# Patient Record
Sex: MALE | Race: WHITE | Employment: OTHER | URBAN - METROPOLITAN AREA
[De-identification: names, ages, dates, MRNs, and addresses within clinical notes are randomized per-mention and may not be internally consistent; named-entity substitution may affect disease eponyms.]

---

## 2018-03-20 ENCOUNTER — PAT TELEPHONE (OUTPATIENT)
Dept: PREADMISSION TESTING | Age: 73
End: 2018-03-20

## 2018-03-20 VITALS — WEIGHT: 200 LBS | HEIGHT: 67 IN | BODY MASS INDEX: 31.39 KG/M2

## 2018-03-20 ASSESSMENT — PAIN - FUNCTIONAL ASSESSMENT: PAIN_FUNCTIONAL_ASSESSMENT: 0-10

## 2018-03-20 ASSESSMENT — PAIN SCALES - GENERAL: PAINLEVEL_OUTOF10: 6

## 2018-03-20 ASSESSMENT — PAIN DESCRIPTION - PAIN TYPE: TYPE: CHRONIC PAIN

## 2018-03-21 ENCOUNTER — HOSPITAL ENCOUNTER (OUTPATIENT)
Dept: ENDOSCOPY | Age: 73
Discharge: HOME | End: 2018-03-21
Attending: INTERNAL MEDICINE | Admitting: INTERNAL MEDICINE

## 2018-03-21 VITALS
DIASTOLIC BLOOD PRESSURE: 54 MMHG | WEIGHT: 198.41 LBS | RESPIRATION RATE: 18 BRPM | OXYGEN SATURATION: 95 % | TEMPERATURE: 97.5 F | BODY MASS INDEX: 31.14 KG/M2 | HEART RATE: 69 BPM | HEIGHT: 67 IN | SYSTOLIC BLOOD PRESSURE: 108 MMHG

## 2018-03-21 LAB
ANION GAP SERPL CALCULATED.3IONS-SCNC: 17 MMOL/L (ref 3–16)
BUN BLDV-MCNC: 16 MG/DL (ref 7–20)
CALCIUM SERPL-MCNC: 8.9 MG/DL (ref 8.3–10.6)
CHLORIDE BLD-SCNC: 98 MMOL/L (ref 99–110)
CO2: 20 MMOL/L (ref 21–32)
CREAT SERPL-MCNC: 1 MG/DL (ref 0.8–1.3)
GFR AFRICAN AMERICAN: >60
GFR NON-AFRICAN AMERICAN: >60
GLUCOSE BLD-MCNC: 134 MG/DL (ref 70–99)
GLUCOSE BLD-MCNC: 185 MG/DL (ref 70–99)
HCT VFR BLD CALC: 41.1 % (ref 40.5–52.5)
HEMOGLOBIN: 13.8 G/DL (ref 13.5–17.5)
MCH RBC QN AUTO: 30.2 PG (ref 26–34)
MCHC RBC AUTO-ENTMCNC: 33.5 G/DL (ref 31–36)
MCV RBC AUTO: 90.3 FL (ref 80–100)
PDW BLD-RTO: 14.4 % (ref 12.4–15.4)
PERFORMED ON: ABNORMAL
PLATELET # BLD: 239 K/UL (ref 135–450)
PMV BLD AUTO: 7.7 FL (ref 5–10.5)
POTASSIUM REFLEX MAGNESIUM: 4.1 MMOL/L (ref 3.5–5.1)
RBC # BLD: 4.55 M/UL (ref 4.2–5.9)
SODIUM BLD-SCNC: 135 MMOL/L (ref 136–145)
WBC # BLD: 14.5 K/UL (ref 4–11)

## 2018-03-21 PROCEDURE — 93010 ELECTROCARDIOGRAM REPORT: CPT | Performed by: INTERNAL MEDICINE

## 2018-03-21 RX ORDER — SODIUM CHLORIDE 0.9 % (FLUSH) 0.9 %
10 SYRINGE (ML) INJECTION EVERY 12 HOURS SCHEDULED
Status: DISCONTINUED | OUTPATIENT
Start: 2018-03-21 | End: 2018-03-22 | Stop reason: HOSPADM

## 2018-03-21 RX ORDER — FENTANYL CITRATE 50 UG/ML
25 INJECTION, SOLUTION INTRAMUSCULAR; INTRAVENOUS EVERY 5 MIN PRN
Status: DISCONTINUED | OUTPATIENT
Start: 2018-03-21 | End: 2018-03-22 | Stop reason: HOSPADM

## 2018-03-21 RX ORDER — OXYCODONE HYDROCHLORIDE AND ACETAMINOPHEN 5; 325 MG/1; MG/1
1 TABLET ORAL PRN
Status: ACTIVE | OUTPATIENT
Start: 2018-03-21 | End: 2018-03-21

## 2018-03-21 RX ORDER — ONDANSETRON 2 MG/ML
4 INJECTION INTRAMUSCULAR; INTRAVENOUS
Status: ACTIVE | OUTPATIENT
Start: 2018-03-21 | End: 2018-03-21

## 2018-03-21 RX ORDER — OXYCODONE HYDROCHLORIDE AND ACETAMINOPHEN 5; 325 MG/1; MG/1
2 TABLET ORAL PRN
Status: ACTIVE | OUTPATIENT
Start: 2018-03-21 | End: 2018-03-21

## 2018-03-21 RX ORDER — MORPHINE SULFATE 2 MG/ML
1 INJECTION, SOLUTION INTRAMUSCULAR; INTRAVENOUS EVERY 5 MIN PRN
Status: DISCONTINUED | OUTPATIENT
Start: 2018-03-21 | End: 2018-03-22 | Stop reason: HOSPADM

## 2018-03-21 RX ORDER — MORPHINE SULFATE 2 MG/ML
2 INJECTION, SOLUTION INTRAMUSCULAR; INTRAVENOUS EVERY 5 MIN PRN
Status: DISCONTINUED | OUTPATIENT
Start: 2018-03-21 | End: 2018-03-22 | Stop reason: HOSPADM

## 2018-03-21 RX ORDER — SODIUM CHLORIDE 0.9 % (FLUSH) 0.9 %
10 SYRINGE (ML) INJECTION PRN
Status: DISCONTINUED | OUTPATIENT
Start: 2018-03-21 | End: 2018-03-22 | Stop reason: HOSPADM

## 2018-03-21 RX ORDER — FENTANYL CITRATE 50 UG/ML
50 INJECTION, SOLUTION INTRAMUSCULAR; INTRAVENOUS EVERY 5 MIN PRN
Status: DISCONTINUED | OUTPATIENT
Start: 2018-03-21 | End: 2018-03-22 | Stop reason: HOSPADM

## 2018-03-21 RX ORDER — MEPERIDINE HYDROCHLORIDE 25 MG/ML
12.5 INJECTION INTRAMUSCULAR; INTRAVENOUS; SUBCUTANEOUS EVERY 5 MIN PRN
Status: DISCONTINUED | OUTPATIENT
Start: 2018-03-21 | End: 2018-03-22 | Stop reason: HOSPADM

## 2018-03-21 RX ORDER — SODIUM CHLORIDE 9 MG/ML
INJECTION, SOLUTION INTRAVENOUS CONTINUOUS
Status: DISCONTINUED | OUTPATIENT
Start: 2018-03-21 | End: 2018-03-22 | Stop reason: HOSPADM

## 2018-03-21 RX ADMIN — SODIUM CHLORIDE: 9 INJECTION, SOLUTION INTRAVENOUS at 09:49

## 2018-03-21 ASSESSMENT — PAIN - FUNCTIONAL ASSESSMENT: PAIN_FUNCTIONAL_ASSESSMENT: 0-10

## 2018-03-21 ASSESSMENT — PAIN DESCRIPTION - PAIN TYPE
TYPE: SURGICAL PAIN
TYPE: SURGICAL PAIN

## 2018-03-21 ASSESSMENT — PAIN SCALES - GENERAL
PAINLEVEL_OUTOF10: 0
PAINLEVEL_OUTOF10: 0

## 2018-03-21 ASSESSMENT — PAIN DESCRIPTION - DESCRIPTORS: DESCRIPTORS: ACHING

## 2018-03-21 ASSESSMENT — LIFESTYLE VARIABLES: SMOKING_STATUS: 0

## 2018-03-21 NOTE — H&P
[facility]   Pre-operative History and Physical    Patient: Harley Blocker  : 1945  Acct#:         HISTORY OF PRESENT ILLNESS:    The patient is a 74 y.o. male  who presents with colon cancer screening  Past Medical History:        Diagnosis Date    CAD (coronary artery disease)     stent    Chronic low back pain     Constipation     d/t opoids use--pt discontinued    Depression     Diabetic acidosis, type II ([address]     History of bradycardia     Hyperlipidemia     Hypertension     Kidney stones     Pacemaker 2011    Medtronic model #ADDR01    Risk for falls     uses a walker/cane when out of house    Self-catheterizes urinary bladder     Since 2017 possibly r/t nerve damage in back    Wears glasses      Past Surgical History:        Procedure Laterality Date    CARDIAC SURGERY      CORONARY ANGIOPLASTY WITH STENT PLACEMENT     CYSTOSCOPY      stents    EYE SURGERY Right     cataract removed right eye    FRACTURE SURGERY Left     fractured ankle    HERNIA REPAIR  90's    ventral hernia w/ mesh    LITHOTRIPSY  06/09/2016    X 4    PACEMAKER PLACEMENT  2011    Medtronic model #ADDR01    RHINOPLASTY  70's    TONSILLECTOMY AND ADENOIDECTOMY       Medications Prior to Admission:   Current Outpatient Prescriptions on File Prior to Encounter   Medication Sig Dispense Refill    metoprolol (TOPROL-XL) 50 MG XL tablet Take 50 mg by mouth nightly       glyBURIDE (DIABETA) 5 MG tablet Take 5 mg by mouth 2 times daily (with meals)       sitaGLIPtan-metformin (JANUMET)  MG per tablet Take 1 tablet by mouth 2 times daily (with meals)      clopidogrel (PLAVIX) 75 MG tablet Take 75 mg by mouth nightly       metFORMIN (GLUCOPHAGE) 500 MG tablet Take 500 mg by mouth See Admin Instructions Takes @ noon daily      Multiple Vitamins-Minerals (MULTIVITAMIN PO) Take by mouth      Naproxen Sodium 220 MG CAPS Take 1 tablet by mouth daily       Red Yeast Rice 600

## 2018-03-21 NOTE — PROGRESS NOTES
Warmed blanket over pt. Awakens more easily. Still sleepy. Moved extremities x4 to command. Resting quietly.

## 2018-03-21 NOTE — ANESTHESIA PRE-OP
Department of Anesthesiology  Preprocedure Note       Name:  Samreen Olmedo   Age:  74 y.o.  :  1945                                          MRN:  [id number]         Date:  3/21/2018      [facility] Department of Anesthesiology  Pre-Anesthesia Evaluation/Consultation       Name:  Samreen Olmedo                                         Age:  74 y.o.   MRN:  [id number]           Procedure (Scheduled):  colonoscopy  Surgeon:  Dr. 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    Dysplasia of tongue    Leukoplakia of oral mucosa/tongue     Past Medical History:   Diagnosis Date    CAD (coronary artery disease)     stent    Chronic low back pain     Constipation 2017    d/t opoids use--pt discontinued    Depression     Diabetic acidosis, type II ([address]     History of bradycardia     Hyperlipidemia     Hypertension     Kidney stones     Pacemaker 2011    Medtronic model #ADDR01    Risk for falls     uses a walker/cane when out of house    Self-catheterizes urinary bladder     Since 2017 possibly r/t nerve damage in back    Wears glasses      Past Surgical History:   Procedure Laterality Date    CARDIAC SURGERY      CORONARY ANGIOPLASTY WITH STENT PLACEMENT     CYSTOSCOPY      stents    EYE SURGERY Right     cataract removed right eye    FRACTURE SURGERY Left     fractured ankle    HERNIA REPAIR  90's    ventral hernia w/ mesh    LITHOTRIPSY  06/09/2016    X 4    PACEMAKER PLACEMENT  2011    Medtronic model #ADDR01    RHINOPLASTY  70's    TONSILLECTOMY AND ADENOIDECTOMY       Social History   Substance Use Topics    Smoking status: Former Smoker     Packs/day: 1.00     Years: 18.00     Types: Cigarettes     Start tablet Take 500 mg by mouth See Admin Instructions Takes @ noon daily      Multiple Vitamins-Minerals (MULTIVITAMIN PO) Take by mouth      Naproxen Sodium 220 MG CAPS Take 1 tablet by mouth daily       Red Yeast Rice 600 MG TABS Take by mouth 2 times daily       Garlic 285 MG TABS Take 600 mg by mouth 2 times daily        Current Facility-Administered Medications   Medication Dose Route Frequency Provider Last Rate Last Dose    0.9 % sodium chloride infusion   Intravenous Continuous Nanda Patton  mL/hr at 03/21/18 0949      sodium chloride flush 0.9 % injection 10 mL  10 mL Intravenous 2 times per day Nanda Patton MD        sodium chloride flush 0.9 % injection 10 mL  10 mL Intravenous PRN Nanda Patton MD           Allergies:     Allergies   Allergen Reactions    Simvastatin Other (See Comments)     myalgia    Statins Other (See Comments)     myalgia    Clarithromycin Rash    Penicillins Rash       Problem List:    Patient Active Problem List   Diagnosis Code    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 D37.02    Dysplasia of tongue K14.9    Leukoplakia of oral mucosa/tongue K13.21       Past Medical History:        Diagnosis Date    CAD (coronary artery disease) 2008    stent    Chronic low back pain     Constipation 2017    d/t opoids use--pt discontinued    Depression     Diabetic acidosis, type II ([facility]     History of bradycardia     Hyperlipidemia     Hypertension     Kidney stones     Pacemaker 09/08/2011    Medtronic model #ADDR01    Risk for falls     uses a walker/cane when out of house    Self-catheterizes urinary bladder     Since Jan 2017 possibly r/t nerve damage in back    Wears glasses        Past Surgical History:        Procedure Laterality Date    CARDIAC SURGERY  2008    CORONARY ANGIOPLASTY WITH STENT PLACEMENT     CYSTOSCOPY      stents    EYE SURGERY Right 2016    cataract removed right eye    FRACTURE SURGERY Left 1966    fractured kidney stones, bowel prep,      (-) hepatitis and liver disease       Endo/Other:    (+) DiabetesType II DM, , blood dyscrasia (did not stop): anticoagulation therapy:., malignancy/cancer (tongue leukoplacia). (-) hypothyroidism, hyperthyroidism               Abdominal:   (+) obese,     Abdomen: soft. Vascular: negative vascular ROS. Anesthesia Plan      TIVA     ASA 3       Induction: intravenous. MIPS: Prophylactic antiemetics administered. Anesthetic plan and risks discussed with patient. Plan discussed with CRNA. This pre-anesthesia assessment may be used as a history and physical.    DOS STAFF ADDENDUM:    Pt seen and examined, chart reviewed (including anesthesia, drug and allergy history). No interval changes to history and physical examination. Anesthetic plan, risks, benefits, alternatives, and personnel involved discussed with patient. Patient verbalized an understanding and agrees to proceed.       Valerie Trujillo MD  March 21, 2018  10:05 AM      Valerie Trujillo MD   3/21/2018

## 2018-03-21 NOTE — ANESTHESIA POST-OP
[facility] Department of Anesthesiology  Post-Anesthesia Note       Name:  Sherryle Rous                                  Age:  74 y.o. MRN:  [id number]     Last Vitals & Oxygen Saturation: BP (!) 108/54   Pulse 69   Temp 97.5 °F (36.4 °C) (Temporal)   Resp 18   Ht 5' 7\" (1.702 m)   Wt 198 lb 6.6 oz (90 kg)   SpO2 95%   BMI 31.08 kg/m²   Patient Vitals for the past 4 hrs:   BP Temp Temp src Pulse Resp SpO2   03/21/18 1226 (!) 108/54 - - 69 - -   03/21/18 1225 (!) 90/47 - - - 18 95 %   03/21/18 1159 (!) 105/56 97.5 °F (36.4 °C) Temporal 71 18 95 %   03/21/18 1132 101/64 - - 70 18 95 %   03/21/18 1128 - - - - - 100 %   03/21/18 1127 97/64 - - 70 16 100 %   03/21/18 1122 100/66 - - 70 18 100 %   03/21/18 1117 93/62 - - 71 21 100 %   03/21/18 1115 (!) 97/55 - - - - 98 %   03/21/18 1112 (!) 97/55 97.6 °F (36.4 °C) Temporal 73 14 99 %       Level of consciousness:  Awake, alert to baseline    Respiratory: Respirations easy, no distress. Stable. Cardiovascular: Hemodynamically stable. Hydration: Adequate. PONV: Adequately managed. Post-op pain: Adequately controlled. Post-op assessment: Tolerated anesthetic well without complication. Complications:  None.     Donnamae Gitelman, MD  March 21, 2018   2:37 PM

## 2018-03-22 LAB
EKG ATRIAL RATE: 71 BPM
EKG DIAGNOSIS: NORMAL
EKG P AXIS: -1 DEGREES
EKG P-R INTERVAL: 100 MS
EKG Q-T INTERVAL: 406 MS
EKG QRS DURATION: 84 MS
EKG QTC CALCULATION (BAZETT): 441 MS
EKG R AXIS: 30 DEGREES
EKG T AXIS: 39 DEGREES
EKG VENTRICULAR RATE: 71 BPM

## 2018-12-14 ENCOUNTER — OFFICE VISIT (OUTPATIENT)
Dept: ENT CLINIC | Age: 73
End: 2018-12-14
Payer: MEDICARE

## 2018-12-14 VITALS
DIASTOLIC BLOOD PRESSURE: 70 MMHG | BODY MASS INDEX: 31.39 KG/M2 | HEIGHT: 67 IN | HEART RATE: 72 BPM | WEIGHT: 200 LBS | SYSTOLIC BLOOD PRESSURE: 129 MMHG

## 2018-12-14 DIAGNOSIS — K13.21 LEUKOPLAKIA OF ORAL MUCOSA/TONGUE: Primary | ICD-10-CM

## 2018-12-14 DIAGNOSIS — K14.8 TONGUE LESION: ICD-10-CM

## 2018-12-14 PROBLEM — E78.5 HYPERLIPIDEMIA: Status: ACTIVE | Noted: 2018-12-14

## 2018-12-14 PROBLEM — M47.816 FACET ARTHROPATHY, LUMBAR: Status: ACTIVE | Noted: 2018-04-17

## 2018-12-14 PROBLEM — E11.9 TYPE 2 DIABETES MELLITUS (HCC): Status: ACTIVE | Noted: 2018-12-14

## 2018-12-14 PROBLEM — M48.02 CERVICAL SPINAL STENOSIS: Status: ACTIVE | Noted: 2017-01-30

## 2018-12-14 PROBLEM — M85.861 OSTEOPENIA OF BOTH LOWER LEGS: Status: ACTIVE | Noted: 2018-08-02

## 2018-12-14 PROBLEM — R53.1 RIGHT SIDED WEAKNESS: Status: ACTIVE | Noted: 2017-01-26

## 2018-12-14 PROBLEM — S32.030A CLOSED COMPRESSION FRACTURE OF L3 LUMBAR VERTEBRA: Status: ACTIVE | Noted: 2018-04-17

## 2018-12-14 PROBLEM — M19.90 ARTHRITIS: Status: ACTIVE | Noted: 2018-12-14

## 2018-12-14 PROBLEM — M51.36 DDD (DEGENERATIVE DISC DISEASE), LUMBAR: Status: ACTIVE | Noted: 2018-04-17

## 2018-12-14 PROBLEM — R33.9 RETENTION OF URINE: Status: ACTIVE | Noted: 2017-01-30

## 2018-12-14 PROBLEM — M85.862 OSTEOPENIA OF BOTH LOWER LEGS: Status: ACTIVE | Noted: 2018-08-02

## 2018-12-14 PROBLEM — I10 HYPERTENSION: Status: ACTIVE | Noted: 2018-12-14

## 2018-12-14 PROCEDURE — 99214 OFFICE O/P EST MOD 30 MIN: CPT | Performed by: OTOLARYNGOLOGY

## 2018-12-14 RX ORDER — ACETAMINOPHEN 160 MG
1000 TABLET,DISINTEGRATING ORAL
COMMUNITY

## 2018-12-14 RX ORDER — TRIAMCINOLONE ACETONIDE 0.1 %
PASTE (GRAM) DENTAL
COMMUNITY
Start: 2018-04-16 | End: 2022-08-23

## 2018-12-14 RX ORDER — POLYETHYLENE GLYCOL 3350 17 G/17G
17 POWDER, FOR SOLUTION ORAL DAILY PRN
COMMUNITY

## 2018-12-14 RX ORDER — CIPROFLOXACIN 250 MG/1
250 TABLET, FILM COATED ORAL 2 TIMES DAILY
COMMUNITY
End: 2022-08-23

## 2018-12-14 RX ORDER — MAGNESIUM GLUCONATE 27 MG(500)
500 TABLET ORAL 2 TIMES DAILY
COMMUNITY

## 2018-12-14 RX ORDER — CHOLECALCIFEROL (VITAMIN D3) 125 MCG
CAPSULE ORAL DAILY
COMMUNITY

## 2019-01-18 ENCOUNTER — PROCEDURE VISIT (OUTPATIENT)
Dept: ENT CLINIC | Age: 74
End: 2019-01-18
Payer: MEDICARE

## 2019-01-18 VITALS
BODY MASS INDEX: 29.82 KG/M2 | SYSTOLIC BLOOD PRESSURE: 129 MMHG | HEIGHT: 67 IN | DIASTOLIC BLOOD PRESSURE: 72 MMHG | WEIGHT: 190 LBS | HEART RATE: 71 BPM

## 2019-01-18 DIAGNOSIS — K13.21 LEUKOPLAKIA OF ORAL MUCOSA/TONGUE: Primary | ICD-10-CM

## 2019-01-18 DIAGNOSIS — K14.8 TONGUE LESION: ICD-10-CM

## 2019-01-18 PROCEDURE — 41100 BIOPSY OF TONGUE: CPT | Performed by: OTOLARYNGOLOGY

## 2019-01-25 ENCOUNTER — TELEPHONE (OUTPATIENT)
Dept: ENT CLINIC | Age: 74
End: 2019-01-25

## 2019-01-29 ENCOUNTER — TELEPHONE (OUTPATIENT)
Dept: ENT CLINIC | Age: 74
End: 2019-01-29

## 2019-02-20 ENCOUNTER — TELEPHONE (OUTPATIENT)
Dept: ENT CLINIC | Age: 74
End: 2019-02-20

## 2022-08-02 ENCOUNTER — OFFICE VISIT (OUTPATIENT)
Dept: ENT CLINIC | Age: 77
End: 2022-08-02
Payer: MEDICARE

## 2022-08-02 VITALS
HEART RATE: 74 BPM | TEMPERATURE: 97.7 F | OXYGEN SATURATION: 96 % | SYSTOLIC BLOOD PRESSURE: 125 MMHG | DIASTOLIC BLOOD PRESSURE: 74 MMHG

## 2022-08-02 DIAGNOSIS — K13.21 LEUKOPLAKIA OF ORAL MUCOSA/TONGUE: Primary | ICD-10-CM

## 2022-08-02 PROCEDURE — 99204 OFFICE O/P NEW MOD 45 MIN: CPT | Performed by: OTOLARYNGOLOGY

## 2022-08-02 PROCEDURE — 1123F ACP DISCUSS/DSCN MKR DOCD: CPT | Performed by: OTOLARYNGOLOGY

## 2022-08-02 ASSESSMENT — ENCOUNTER SYMPTOMS
SINUS PAIN: 0
RHINORRHEA: 0
VOICE CHANGE: 0
TROUBLE SWALLOWING: 0
SORE THROAT: 0

## 2022-08-02 NOTE — PATIENT INSTRUCTIONS
Mouthwash with half strength peroxide (one half cup water plus one half cup hydrogen peroxide plus one half teaspoon of table salt) twice daily. Use Magic mouthwash as prescribed.   Hold your plavix for 7 days prior to the next visit if approved by your PCP and/or cardiologist.  Arville Lincoln your aspirin for 72 hours (3 full days) prior the next visit if approved by your PCP and/or cardiologist.

## 2022-08-02 NOTE — PROGRESS NOTES
[facility]       \"NEW\"  PATIENT VISIT   (ESTABLISHED, who has not been seen by a [facility] Otolaryngologist in over 3 years)       PCP:  lCary Douglas, [facility]  Chief Complaint   Patient presents with    Mouth Lesions     Lesion under the tongue        HISTORY OF PRESENT ILLNESS       Marion Ty is a 64 y.o. male. Went to dentist for dentures and dentist noted lesion on the tongue and said that needs to be taken care of before they will do dentures. He did not return for FU after last visit and was ill and hospitalized and \"I was in rehab for a while and then I just forgot about it and it wasn't bothering me. \"  No bleeding or drainage from the lesion. It hurts when I eat something spicy hot and hurts sometimes. \"It has never been as bad as it was three years ago or I would have come in sooner. \"      REVIEW OF SYSTEMS   Review of Systems   Constitutional:  Negative for chills, fever and unexpected weight change. 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 Negative for congestion, ear discharge, ear pain, hearing loss, nosebleeds, postnasal drip, rhinorrhea, sinus pain, sore throat, trouble swallowing and voice change. Neurological:  Negative for dizziness.          PAST MEDICAL HISTORY    Past Medical History:   Diagnosis Date    CAD (coronary artery disease) 2008    stent    Chronic low back pain     Constipation 2017    d/t opoids use--pt discontinued    Depression     Diabetic acidosis, type II ([facility] [address]     History of bradycardia     Hyperlipidemia     Hypertension     Kidney stones     Pacemaker 09/08/2011    Medtronic model #ADDR01    Risk for falls     uses a walker/cane when out of house    Self-catheterizes urinary bladder     Since Jan 2017 possibly r/t nerve damage in back    Wears glasses Past Surgical History:   Procedure Laterality Date    CARDIAC SURGERY  2008    CORONARY ANGIOPLASTY WITH STENT PLACEMENT     COLONOSCOPY      CYSTOSCOPY      stents    EYE SURGERY Right 2016    cataract removed right eye    FRACTURE SURGERY Left 1966    fractured ankle    HERNIA REPAIR  90's    ventral hernia w/ mesh    LITHOTRIPSY  06/09/2016    X 4    PACEMAKER PLACEMENT  08/2011    Medtronic model #ADDR01    RHINOPLASTY  70's    TONSILLECTOMY AND ADENOIDECTOMY           EXAMINATION    Vitals:    08/02/22 1416   BP: 125/74   Site: Left Upper Arm   Position: Sitting   Cuff Size: Medium Adult   Pulse: 74   Temp: 97.7 °F (36.5 °C)   TempSrc: Temporal   SpO2: 96%     General:  WDWN, NAD, alert and oriented  Face: There was no swelling or lesions detected. Voice: Normal with no hoarseness or hot potato voice. Ears:  TMs and EACs appeared to be normal. ed.      Nose: The nasal septum, turbinates, secretions, and mucosa appeared to be normal.   Sinuses:  Maxillary and frontal sinuses were nontender to palpation and percussion. Oral cavity:  Mucosa, secretions, tongue, and gingiva appeared to be normal.   Oropharynx:  Post tonsillectomy. The hard and soft palates, uvula, tongue, posterior oropharyngeal wall, mucosa and secretions appeared to be normal.     Salivary Glands:  Normal bilateral parotid and bilateral submandibular salivary glands. Neck:  No masses or tenderness. Trachea midline. Laryngeal cartilages and hyoid bone normal.  Normal laryngeal crepitus. Thyroid:  Normal, nontender, no goiter or nodules palpable. Lymph nodes:  No cervical lymphadenopathy. CAROL / Rosamaria Armando / Lidia Burks was seen today for mouth lesions. Diagnoses and all orders for this visit:    Leukoplakia of oral mucosa/tongue  -     Magic Mouthwash (MIRACLE MOUTHWASH);  Swish and spit 5 mLs 4 times daily as needed for Irritation         RECOMMENDATIONS/PLAN      Acetaminophen (eg. Tylenol) or Ibuprofen (eg. Advil) (over the counter medications) as needed for fever or pain. Return in about 3 weeks (around 8/23/2022) for biopsy of oral lesion, if persistent. .        Patient Instructions   Mouthwash with half strength peroxide (one half cup water plus one half cup hydrogen peroxide plus one half teaspoon of table salt) twice daily. Use Magic mouthwash as prescribed.   Hold your plavix for 7 days prior to the next visit if approved by your PCP and/or cardiologist.  Arminda Chaoon your aspirin for 72 hours (3 full days) prior the next visit if approved by your PCP and/or cardiologist.

## 2022-08-23 ENCOUNTER — OFFICE VISIT (OUTPATIENT)
Dept: ENT CLINIC | Age: 77
End: 2022-08-23
Payer: MEDICARE

## 2022-08-23 VITALS
TEMPERATURE: 97.5 F | DIASTOLIC BLOOD PRESSURE: 80 MMHG | OXYGEN SATURATION: 76 % | SYSTOLIC BLOOD PRESSURE: 130 MMHG | HEART RATE: 94 BPM

## 2022-08-23 DIAGNOSIS — K13.21 LEUKOPLAKIA OF ORAL MUCOSA/TONGUE: Primary | ICD-10-CM

## 2022-08-23 DIAGNOSIS — D37.02 NEOPLASM OF UNCERTAIN BEHAVIOR OF ANTERIOR TWO-THIRDS OF TONGUE: ICD-10-CM

## 2022-08-23 PROCEDURE — 41100 BIOPSY OF TONGUE: CPT | Performed by: OTOLARYNGOLOGY

## 2022-08-23 NOTE — PATIENT INSTRUCTIONS
Ivelisse Ayala. Nelida Nielsen M.D.  [address]  [phone]    POST OPERATIVE INSTRUCTIONS  BIOPSY OF TONGUE LESION    The following information should answer most of your questions regarding what to expect and the post operative care following your oral lesion biopsy. Please read this through and keep it available through the first 2 weeks after surgery. ORAL PAIN  You may experience oral pain after the procedure. Use acetaminophen (Tylenol) for mild to moderate post pain. You may use your Percocet as needed for severe pain. BLEEDING  Occasionally bleeding occurs following biopsy of intra-oral lesions. This is usually not severe and not serious. If this occurs, do the following:  Lie quietly with the head elevated  Use cold water as a mouthwash. Hold pressure on the tongue with a gauze pad for 10 minutes. Repeat as needed. If bleeding continues longer than 30 minutes or is greater than 1 cup, call [phone] to have Dr. Nelida Nielsen of the physician on call paged. ACTIVITY  Your normal activity may be resumed. DIET   Soft diet may be preferred for the first 3 to 5 days. Avoid any spicy or hot foods or fluids, until these can be eaten comfortably. You may resume a regular diet as soon as this is tolerated. SWELLING  Swelling of the operated area is typical and expected. This will usually subside over 5 to 10 days after surgery. QUESTIONS? If you have any questions or have any problems after surgery, please do not hesitate to call our office at [phone].

## 2022-08-23 NOTE — PROGRESS NOTES
PROCEDURE: Biopsy of lesion, of tongue. INDICATIONS:  Leukoplakia and erythroplakia of mid right lateral tongue and posterior lateral tongue (anterior 2/3). INFORMED CONSENT:  Patient was counseled for the procedure above, which was described. The attendant risks and potential complications were discussed including, but not limited to the following: persistence or recurrence of lesion, excessive (hypertrophic) scar, infection, bleeding, numbness of tongue, and need for further surgery or therapy. Patient asked appropriate questions and then stated understanding and acceptance of all of the preceding, the lack of further questions, and the desire to proceed with biopsy. Informed  consent obtained and documented on the informed consent document      DESCRIPTION OF PROCEDURE:  The right lateral tongue was sprayed with Cetacaine spray. After approximately one minute, each site was infiltrated with 0.5 ml of 1% lidocaine with 1:100,000 epinephrine. Two biopsies were taken of the lateral tongue mucosa, one about one cm anterior to the more posterior site, using a cup biopsy forceps and Metzenbaum sharp dissecting scissors. Bleeding was controlled with a silver nitrate stick and pressure. No complications were apparent. Patient was discharged to home in stable condition. IMPRESSION:  1. Leukoplakia of oral mucosa/tongue          PLAN:    Acetaminophen (eg. Tylenol) or Ibuprofen (eg. Advil) OTC prn pain. Call for results of pathology in 10 days, if not informed sooner. Avoid hot or spicy foods until these can be eaten comfortable. Return for follow-up, to be arranged. Orders Placed This Encounter   Procedures    SURGICAL PATHOLOGY    Surgical Pathology       Patient Instructions   Leonel Bryant.  Naomi Amato M.D.  [address] [place] [address], [address] [place] [address]  [phone]    POST OPERATIVE INSTRUCTIONS  BIOPSY OF TONGUE LESION    The following information should answer most of your questions

## 2022-08-29 ENCOUNTER — TELEPHONE (OUTPATIENT)
Dept: ENT CLINIC | Age: 77
End: 2022-08-29

## 2022-08-29 NOTE — TELEPHONE ENCOUNTER
Patient is calling in for his results of his oral lesion biopsy done in the office on 8/23/2022. Patient got an email from [facility]. Checked his mychart and it told him to call the office for results.

## 2022-09-02 ENCOUNTER — TELEPHONE (OUTPATIENT)
Dept: ENT CLINIC | Age: 77
End: 2022-09-02

## 2022-09-02 NOTE — TELEPHONE ENCOUNTER
Please call patient and see if he can come into the office this afternoon for a recheck of the tongue lesion and discuss his pathology results.

## 2022-09-08 ENCOUNTER — OFFICE VISIT (OUTPATIENT)
Dept: ENT CLINIC | Age: 77
End: 2022-09-08
Payer: MEDICARE

## 2022-09-08 VITALS — SYSTOLIC BLOOD PRESSURE: 123 MMHG | DIASTOLIC BLOOD PRESSURE: 79 MMHG | HEART RATE: 87 BPM | TEMPERATURE: 97.5 F

## 2022-09-08 DIAGNOSIS — C02.3 MALIGNANT NEOPLASM OF ANTERIOR TWO-THIRDS OF TONGUE (HCC): Primary | ICD-10-CM

## 2022-09-08 DIAGNOSIS — C02.3 MALIGNANT NEOPLASM OF ANTERIOR TWO-THIRDS OF TONGUE (HCC): Primary | Chronic | ICD-10-CM

## 2022-09-08 PROCEDURE — 1123F ACP DISCUSS/DSCN MKR DOCD: CPT | Performed by: OTOLARYNGOLOGY

## 2022-09-08 PROCEDURE — 99214 OFFICE O/P EST MOD 30 MIN: CPT | Performed by: OTOLARYNGOLOGY

## 2022-09-26 ENCOUNTER — HOSPITAL ENCOUNTER (OUTPATIENT)
Dept: PET IMAGING | Age: 77
Discharge: HOME | End: 2022-09-26
Payer: MEDICARE

## 2022-09-26 DIAGNOSIS — C02.9 TONGUE CANCER (HCC): ICD-10-CM

## 2022-09-26 PROCEDURE — 3430000000 HC RX DIAGNOSTIC RADIOPHARMACEUTICAL: Performed by: RADIOLOGY

## 2022-09-26 PROCEDURE — A9552 F18 FDG: HCPCS | Performed by: RADIOLOGY

## 2022-09-26 PROCEDURE — 78815 PET IMAGE W/CT SKULL-THIGH: CPT

## 2022-09-26 RX ORDER — FLUDEOXYGLUCOSE F 18 200 MCI/ML
15.74 INJECTION, SOLUTION INTRAVENOUS
Status: COMPLETED | OUTPATIENT
Start: 2022-09-26 | End: 2022-09-26

## 2022-09-26 RX ADMIN — FLUDEOXYGLUCOSE F 18 15.74 MILLICURIE: 200 INJECTION, SOLUTION INTRAVENOUS at 10:56

## 2022-10-03 NOTE — PROGRESS NOTES
History:  Recheck and follow up for lesion of tongue. Examination:    WDWN NAD  Oral cavity:  Lesion of right lateral tongue with healing biopsy sites and otherwise without change. Neck:  no masses or tenderness. Lymphatic:  no cervical lymphadenopathy. PATHOLOGY:  FINAL DIAGNOSIS:        A. Right lateral tongue, biopsy:      - Invasive, moderately differentiated keratinizing squamous carcinoma        with focal ulceration.      - Tumor involves the margins of the biopsy fragment. B. Right lateral tongue, biopsy:      - Focal squamous carcinoma in-situ, extending to the edges of the        biopsy fragment.      - No invasive malignancy identified; see comment. COUNSELING:  Sandie Garcia  was counseled for at length for the diagnosis of invasive squamous cell carcinoma of the tongue. I advised of the alternative for further treatment / management, which would entail surgical excision, or radiation therapy, or a combined treatment with surgical excision and post op radiation therapy. Rupa Wall / Enzo Odom / Kojo Pratt:   Sandie Garcia was seen today for follow-up. Diagnoses and all orders for this visit:    Malignant neoplasm of anterior two-thirds of tongue ([address]  -     CT SOFT TISSUE NECK Chika Murillo MD, Radiation Oncology, [facility]       RECOMMENDATIONS / PLAN:   Right partial glossectomy. Consultation with radiation oncologist regarding radiation therapy option. Patient will decide on options of further management.

## 2022-10-05 ENCOUNTER — TELEPHONE (OUTPATIENT)
Dept: ENT CLINIC | Age: 77
End: 2022-10-05

## 2022-10-05 NOTE — TELEPHONE ENCOUNTER
Patient is calling to follow up on his PET scan results he had with Dr. John Escoto. He states Dr. John Escoto' office told him Dr. Wallis Lesches would be in touch with him.

## 2022-10-06 NOTE — TELEPHONE ENCOUNTER
Phone call. Spoke to patient. Discussed his PET scan and the biopsy results. Discussed Dr. Vance Beyer recommendations. Discussed surgical excision of the carcinoma of the right lateral tongue, using CO2 laser. Discussed the amount of tongue to be removed and advised that at least a 1 cm margin around gross tumor is necessary. Advised that this probably would not involve removal of the whole tongue and probably not even half of the tongue, but would involve a significant portion of the tongue. I advised him that his ability to speak and swallow would be altered to some degree but the lesser amount of tongue removed would give a lesser effect. I advised him to come in to the office for a recheck and then we can plan surgery. He agreed to come in on October 14 at 1 PM for this purpose.

## 2022-10-14 ENCOUNTER — OFFICE VISIT (OUTPATIENT)
Dept: ENT CLINIC | Age: 77
End: 2022-10-14
Payer: MEDICARE

## 2022-10-14 VITALS
DIASTOLIC BLOOD PRESSURE: 74 MMHG | TEMPERATURE: 97.7 F | HEART RATE: 65 BPM | SYSTOLIC BLOOD PRESSURE: 129 MMHG | BODY MASS INDEX: 31.32 KG/M2 | WEIGHT: 200 LBS

## 2022-10-14 DIAGNOSIS — C02.3 MALIGNANT NEOPLASM OF ANTERIOR TWO-THIRDS OF TONGUE (HCC): Primary | ICD-10-CM

## 2022-10-14 PROCEDURE — 3078F DIAST BP <80 MM HG: CPT | Performed by: OTOLARYNGOLOGY

## 2022-10-14 PROCEDURE — 3074F SYST BP LT 130 MM HG: CPT | Performed by: OTOLARYNGOLOGY

## 2022-10-14 PROCEDURE — 1123F ACP DISCUSS/DSCN MKR DOCD: CPT | Performed by: OTOLARYNGOLOGY

## 2022-10-14 PROCEDURE — 99214 OFFICE O/P EST MOD 30 MIN: CPT | Performed by: OTOLARYNGOLOGY

## 2022-10-14 NOTE — PROGRESS NOTES
[facility]       PCP:  Nirmal Chamorro DO      CHIEF COMPLAINT  Chief Complaint   Patient presents with    Cancer     tongue       HISTORY OF PRESENT ILLNESS    Mitali Chavez is a 64 y.o. male here for recheck and follow up of SCC tongue, right side. He saw the radiation oncologist who felt this would be better treated with surgical excision, reserving radiation therapy for possible future need. patient stated that he desires to proceed with surgery.         PAST MEDICAL HISTORY    Past Medical History:   Diagnosis Date    CAD (coronary artery disease) 2008    stent    Chronic low back pain     Constipation 2017    d/t opoids use--pt discontinued    Depression     Diabetic acidosis, type II ([address]     History of bradycardia     Hyperlipidemia     Hypertension     Kidney stones     Pacemaker 09/08/2011    Medtronic model #ADDR01    Prolonged emergence from general anesthesia     Risk for falls     uses a walker/cane when out of house    Self-catheterizes urinary bladder     Since Jan 2017 possibly r/t nerve damage in back    Wears glasses          Past Surgical History:   Procedure Laterality Date    CARDIAC SURGERY  2008    CORONARY ANGIOPLASTY WITH [address]      stents    EYE SURGERY Right 2016    cataract removed right eye    FRACTURE SURGERY Left 1966    fractured ankle    GLOSSECTOMY N/A 11/4/2022    GLOSSECTOMY, PARTIAL; WIDE LOCAL EXCISION OF SQUAMOUS CELL CARCINOMA OF RIGHT VENTROLATERAL TONUE WITH C02 LASER WITH FROZEN SECTIONS WITH CLOSURE performed by Bee Ricks MD at [facility]  90's    ventral hernia w/ mesh    LITHOTRIPSY  06/09/2016    X 4    PACEMAKER PLACEMENT  08/2011    Medtronic model #ADDR01    RHINOPLASTY  70's    TONSILLECTOMY AND ADENOIDECTOMY           EXAMINATION    Vitals:    10/14/22 1259   BP: 129/74   Pulse: 65   Temp: 97.7 °F (36.5 °C) Weight: 200 lb (90.7 kg)     General:  WDWN, NAD, alert and oriented  Face: There was no swelling or lesions detected. Voice: Normal with no hoarseness or hot potato voice. 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 The remainder of the oral mucosa, secretions, tongue, and gingiva appeared to be normal.   Oropharynx:  Post tonsillectomy. The hard and soft palates, uvula, tongue, posterior oropharyngeal wall, mucosa and secretions appeared to be normal.     Salivary Glands:  Normal bilateral parotid and bilateral submandibular salivary glands. Neck:  No masses or tenderness. Trachea midline. Laryngeal cartilages and hyoid bone normal.  Normal laryngeal crepitus. Lymph nodes:  No cervical lymphadenopathy. COUNSELING FOR SURGICAL PROCEDURE(S):  Stone Treviño was advised of the recommended surgery/procedure. Stone Treviño stated that he wants to proceed with surgery. Stone Treviño understands this will be done under general anesthesia. The surgical procedure was described. I discussed the incisional scar and possible resultant deformity. I discussed the surgical technique and the usual post operative course. I discussed the possibility of persistent or recurrent cancer. I advised there is no guarantee of cure, success, or of final results.   I discussed the alternatives of therapy, expected outcome and potential benefits, and the attendant risks and potential complications, including, but not limited to, excessive intraoperative or postoperative bleeding, hemorrhage, infection, numbness or paralysis, hoarseness, change in voice, airway obstruction, injury to surrounding structures or organs, injury to major blood vessels or nerves, excessive scarring, deformity of tongue, poor (incomplete or lack of) wound healing, pain, discomfort, numbness of lip/teeth/gums/tongue, recurrence of cancer, need for further surgery, and risks of anesthesia, including heart attack, cardiac arrest, stroke, blood clots in lower extremity veins, pulmonary embolism, and death, and other risks listed on the informed consent document, which we discussed together. All Austen's questions were answered. Forest Pop then stated and confirmed that he understands and accepts the preceding, has no further questions and desires to proceed with surgery. Then, Forest Kiesha read and signed the informed consent document. Bernadette Ruiz / Frankey Lennert / Zenia Ndiaye was seen today for cancer. Diagnoses and all orders for this visit:    Malignant neoplasm of anterior two-thirds of tongue ([facility] [address]       RECOMMENDATIONS/PLAN      Wide local excision of SCC of right lateral tongue with CO2 laser and/or electrocautery, possible local tongue flap reconstruction. Return for post op check.

## 2022-11-03 RX ORDER — LATANOPROST 50 UG/ML
1 SOLUTION/ DROPS OPHTHALMIC NIGHTLY
COMMUNITY

## 2022-11-03 RX ORDER — ASPIRIN 81 MG/1
81 TABLET ORAL DAILY
COMMUNITY

## 2022-11-03 NOTE — PROGRESS NOTES
Name_______________________________________Printed:____________________  Date and time of surgery______11/4/22  0730__________________Arrival Time:__0600  [facility]______________   1. The instructions given regarding when and if a patient needs to stop oral intake prior to surgery varies. Follow the specific instructions you were given                  XXXXX___Nothing to eat or to drink after Midnight the night before.                   ____Carbo loading or ERAS instructions will be given to select patients-if you have been given those instructions -please do the following                           The evening before your surgery after dinner before midnight drink 40 ounces of gatorade. If you are diabetic use sugar free. The morning of surgery drink 40 ounces of water. This needs to be finished 3 hours prior to your surgery start time. 2. Take the following pills with a small sip of water on the morning of surgery____none_______________________________________________                  Do not take blood pressure medications ending in pril or sartan the gauri prior to surgery or the morning of surgery_   3. Aspirin, Ibuprofen, Advil, Naproxen, Vitamin E and other Anti-inflammatory products and supplements should be stopped for 5 -7days before surgery or as directed by your physician. 4. Check with your Doctor regarding stopping Plavix, Coumadin,Eliquis, Lovenox,Effient,Pradaxa,Xarelto, Fragmin or other blood thinners and follow their instructions. 5. Do not smoke, and do not drink any alcoholic beverages 24 hours prior to surgery. This includes NA Beer. Refrain from the usage of any recreational drugs. 6. You may brush your teeth and gargle the morning of surgery. DO NOT SWALLOW WATER   7. You MUST make arrangements for a responsible adult to stay on site while you are here and take you home after your surgery. You will not be allowed to leave alone or drive yourself home.   It is strongly suggested someone stay with you the first 24 hrs. Your surgery will be cancelled if you do not have a ride home. 8. A parent/legal guardian must accompany a child scheduled for surgery and plan to stay at the hospital until the child is discharged. Please do not bring other children with you. 9. Please wear simple, loose fitting clothing to the hospital.  Lanie Duque not bring valuables (money, credit cards, checkbooks, etc.) Do not wear any makeup (including no eye makeup) or nail polish on your fingers or toes. 10. DO NOT wear any jewelry or piercings on day of surgery. All body piercing jewelry must be removed. 11. If you have ___dentures, they will be removed before going to the OR; we will provide you a container. If you wear ___contact lenses or ___glasses, they will be removed; please bring a case for them. 12. Please see your family doctor/pediatrician for a history & physical and/or concerning medications. Bring any test results/reports from your physician's office. PCP__________________Phone___________H&P Appt. Date________             13 If you  have a Living Will and Durable Power of  for Healthcare, please bring in a copy. 15. Notify your Surgeon if you develop any illness between now and surgery  time, cough, cold, fever, sore throat, nausea, vomiting, etc.  Please notify your surgeon if you experience dizziness, shortness of breath or blurred vision between now & the time of your surgery             15. DO NOT shave your operative site 96 hours prior to surgery. For face & neck surgery, men may use an electric razor 48 hours prior to surgery. 16. Shower the night before or morning of surgery using an antibacterial soap or as you have been instructed. 17. To provide excellent care visitors will be limited to one in the room at any given time. 18.  Please bring picture ID and insurance card.              19.  Visit our web site for additional information:  [email or web address]              20.During flu season no children under the age of 15 are permitted in the hospital for the safety of all patients. 21. If you take a long acting insulin in the evening only  take half of your usual  dose the night  before your procedure              22. If you use a c-pap please bring DOS if staying overnight,             23.For your convenience [facility] has a pharmacy on site to fill your prescriptions. 24. If you use oxygen and have a portable tank please bring it  with you the DOS             25. Bring a complete list of all your medications with name and dose include any supplements. 26. Other__________________________________________   *Please call pre admission testing if you any further questions   [facility]   [address] [place] [address]. [place]  [phone]   [address] [place] [address]       VISITOR POLICY(subject to change)    Current policy is 2 visitors per patient. No children. Mask is  at the discretion of the facility. Visiting hours are 8a-8p. Overnight visitors will be at the discretion of the nurse. All policies subject to change. All above information reviewed with patient in person or by phone. Patient verbalizes understanding. All questions and concerns addressed.                                                                                                  Patient/Rep____________________                                                                                                                                    PRE OP INSTRUCTIONS

## 2022-11-04 ENCOUNTER — ANESTHESIA EVENT (OUTPATIENT)
Dept: OPERATING ROOM | Age: 77
End: 2022-11-04
Payer: MEDICARE

## 2022-11-04 ENCOUNTER — HOSPITAL ENCOUNTER (OUTPATIENT)
Age: 77
Setting detail: OBSERVATION
Discharge: HOME | End: 2022-11-04
Attending: OTOLARYNGOLOGY | Admitting: OTOLARYNGOLOGY
Payer: MEDICARE

## 2022-11-04 ENCOUNTER — ANESTHESIA (OUTPATIENT)
Dept: OPERATING ROOM | Age: 77
End: 2022-11-04
Payer: MEDICARE

## 2022-11-04 VITALS
TEMPERATURE: 99.4 F | HEART RATE: 77 BPM | OXYGEN SATURATION: 96 % | RESPIRATION RATE: 23 BRPM | DIASTOLIC BLOOD PRESSURE: 81 MMHG | BODY MASS INDEX: 30.76 KG/M2 | WEIGHT: 196 LBS | HEIGHT: 67 IN | SYSTOLIC BLOOD PRESSURE: 159 MMHG

## 2022-11-04 DIAGNOSIS — G89.18 POSTOPERATIVE PAIN: ICD-10-CM

## 2022-11-04 DIAGNOSIS — Z98.890 POST-OPERATIVE STATE: ICD-10-CM

## 2022-11-04 DIAGNOSIS — C02.3 CANCER OF LATERAL MARGIN OF ANTERIOR TWO-THIRDS OF TONGUE (HCC): Primary | ICD-10-CM

## 2022-11-04 LAB
GLUCOSE BLD-MCNC: 146 MG/DL (ref 70–99)
GLUCOSE BLD-MCNC: 191 MG/DL (ref 70–99)
PERFORMED ON: ABNORMAL
PERFORMED ON: ABNORMAL

## 2022-11-04 PROCEDURE — 2500000003 HC RX 250 WO HCPCS: Performed by: OTOLARYNGOLOGY

## 2022-11-04 PROCEDURE — 2500000003 HC RX 250 WO HCPCS: Performed by: NURSE ANESTHETIST, CERTIFIED REGISTERED

## 2022-11-04 PROCEDURE — 88331 PATH CONSLTJ SURG 1 BLK 1SPC: CPT

## 2022-11-04 PROCEDURE — 3700000001 HC ADD 15 MINUTES (ANESTHESIA): Performed by: OTOLARYNGOLOGY

## 2022-11-04 PROCEDURE — 2709999900 HC NON-CHARGEABLE SUPPLY: Performed by: OTOLARYNGOLOGY

## 2022-11-04 PROCEDURE — 7100000000 HC PACU RECOVERY - FIRST 15 MIN: Performed by: OTOLARYNGOLOGY

## 2022-11-04 PROCEDURE — 2580000003 HC RX 258: Performed by: OTOLARYNGOLOGY

## 2022-11-04 PROCEDURE — 6360000002 HC RX W HCPCS: Performed by: ANESTHESIOLOGY

## 2022-11-04 PROCEDURE — 3700000000 HC ANESTHESIA ATTENDED CARE: Performed by: OTOLARYNGOLOGY

## 2022-11-04 PROCEDURE — 88332 PATH CONSLTJ SURG EA ADD BLK: CPT

## 2022-11-04 PROCEDURE — 7100000001 HC PACU RECOVERY - ADDTL 15 MIN: Performed by: OTOLARYNGOLOGY

## 2022-11-04 PROCEDURE — 3600000012 HC SURGERY LEVEL 2 ADDTL 15MIN: Performed by: OTOLARYNGOLOGY

## 2022-11-04 PROCEDURE — 88309 TISSUE EXAM BY PATHOLOGIST: CPT

## 2022-11-04 PROCEDURE — 6360000002 HC RX W HCPCS: Performed by: NURSE ANESTHETIST, CERTIFIED REGISTERED

## 2022-11-04 PROCEDURE — 41120 PARTIAL REMOVAL OF TONGUE: CPT | Performed by: OTOLARYNGOLOGY

## 2022-11-04 PROCEDURE — 3600000002 HC SURGERY LEVEL 2 BASE: Performed by: OTOLARYNGOLOGY

## 2022-11-04 PROCEDURE — 88305 TISSUE EXAM BY PATHOLOGIST: CPT

## 2022-11-04 PROCEDURE — G0378 HOSPITAL OBSERVATION PER HR: HCPCS

## 2022-11-04 RX ORDER — LIDOCAINE HYDROCHLORIDE 20 MG/ML
INJECTION, SOLUTION EPIDURAL; INFILTRATION; INTRACAUDAL; PERINEURAL PRN
Status: DISCONTINUED | OUTPATIENT
Start: 2022-11-04 | End: 2022-11-04 | Stop reason: SDUPTHER

## 2022-11-04 RX ORDER — SUCCINYLCHOLINE/SOD CL,ISO/PF 200MG/10ML
SYRINGE (ML) INTRAVENOUS PRN
Status: DISCONTINUED | OUTPATIENT
Start: 2022-11-04 | End: 2022-11-04 | Stop reason: SDUPTHER

## 2022-11-04 RX ORDER — FENTANYL CITRATE 50 UG/ML
INJECTION, SOLUTION INTRAMUSCULAR; INTRAVENOUS
Status: DISCONTINUED
Start: 2022-11-04 | End: 2022-11-04 | Stop reason: HOSPADM

## 2022-11-04 RX ORDER — MAGNESIUM GLUCONATE 27 MG(500)
500 TABLET ORAL 2 TIMES DAILY
Status: CANCELLED | OUTPATIENT
Start: 2022-11-04

## 2022-11-04 RX ORDER — FENTANYL CITRATE 50 UG/ML
25 INJECTION, SOLUTION INTRAMUSCULAR; INTRAVENOUS EVERY 5 MIN PRN
Status: DISCONTINUED | OUTPATIENT
Start: 2022-11-04 | End: 2022-11-04 | Stop reason: HOSPADM

## 2022-11-04 RX ORDER — ASPIRIN 81 MG/1
81 TABLET ORAL DAILY
Status: CANCELLED | OUTPATIENT
Start: 2022-11-04

## 2022-11-04 RX ORDER — GLYBURIDE 5 MG/1
5 TABLET ORAL 2 TIMES DAILY WITH MEALS
Status: CANCELLED | OUTPATIENT
Start: 2022-11-04

## 2022-11-04 RX ORDER — HYDROCODONE BITARTRATE AND ACETAMINOPHEN 7.5; 325 MG/1; MG/1
1 TABLET ORAL EVERY 6 HOURS PRN
Qty: 28 TABLET | Refills: 0 | Status: SHIPPED | OUTPATIENT
Start: 2022-11-04 | End: 2022-11-11

## 2022-11-04 RX ORDER — ONDANSETRON 2 MG/ML
4 INJECTION INTRAMUSCULAR; INTRAVENOUS
Status: DISCONTINUED | OUTPATIENT
Start: 2022-11-04 | End: 2022-11-04 | Stop reason: HOSPADM

## 2022-11-04 RX ORDER — DEXTROSE MONOHYDRATE 100 MG/ML
INJECTION, SOLUTION INTRAVENOUS CONTINUOUS PRN
Status: CANCELLED | OUTPATIENT
Start: 2022-11-04

## 2022-11-04 RX ORDER — PROCHLORPERAZINE EDISYLATE 5 MG/ML
5 INJECTION INTRAMUSCULAR; INTRAVENOUS
Status: DISCONTINUED | OUTPATIENT
Start: 2022-11-04 | End: 2022-11-04 | Stop reason: HOSPADM

## 2022-11-04 RX ORDER — OXYCODONE HYDROCHLORIDE 5 MG/1
5 TABLET ORAL
Status: DISCONTINUED | OUTPATIENT
Start: 2022-11-04 | End: 2022-11-04 | Stop reason: HOSPADM

## 2022-11-04 RX ORDER — PROPOFOL 10 MG/ML
INJECTION, EMULSION INTRAVENOUS PRN
Status: DISCONTINUED | OUTPATIENT
Start: 2022-11-04 | End: 2022-11-04 | Stop reason: SDUPTHER

## 2022-11-04 RX ORDER — ONDANSETRON 2 MG/ML
INJECTION INTRAMUSCULAR; INTRAVENOUS PRN
Status: DISCONTINUED | OUTPATIENT
Start: 2022-11-04 | End: 2022-11-04 | Stop reason: SDUPTHER

## 2022-11-04 RX ORDER — LIDOCAINE HYDROCHLORIDE 10 MG/ML
1 INJECTION, SOLUTION EPIDURAL; INFILTRATION; INTRACAUDAL; PERINEURAL
Status: CANCELLED | OUTPATIENT
Start: 2022-11-04 | End: 2022-11-05

## 2022-11-04 RX ORDER — HYDRALAZINE HYDROCHLORIDE 20 MG/ML
10 INJECTION INTRAMUSCULAR; INTRAVENOUS
Status: DISCONTINUED | OUTPATIENT
Start: 2022-11-04 | End: 2022-11-04 | Stop reason: HOSPADM

## 2022-11-04 RX ORDER — HYDROMORPHONE HCL 110MG/55ML
0.25 PATIENT CONTROLLED ANALGESIA SYRINGE INTRAVENOUS EVERY 5 MIN PRN
Status: DISCONTINUED | OUTPATIENT
Start: 2022-11-04 | End: 2022-11-04 | Stop reason: HOSPADM

## 2022-11-04 RX ORDER — PROMETHAZINE HYDROCHLORIDE 25 MG/1
25 TABLET ORAL EVERY 6 HOURS PRN
Status: CANCELLED | OUTPATIENT
Start: 2022-11-04

## 2022-11-04 RX ORDER — LATANOPROST 50 UG/ML
1 SOLUTION/ DROPS OPHTHALMIC NIGHTLY
Status: CANCELLED | OUTPATIENT
Start: 2022-11-04

## 2022-11-04 RX ORDER — METOPROLOL SUCCINATE 50 MG/1
50 TABLET, EXTENDED RELEASE ORAL NIGHTLY
Status: CANCELLED | OUTPATIENT
Start: 2022-11-04

## 2022-11-04 RX ORDER — LABETALOL HYDROCHLORIDE 5 MG/ML
10 INJECTION, SOLUTION INTRAVENOUS
Status: DISCONTINUED | OUTPATIENT
Start: 2022-11-04 | End: 2022-11-04 | Stop reason: HOSPADM

## 2022-11-04 RX ORDER — ROCURONIUM BROMIDE 10 MG/ML
INJECTION, SOLUTION INTRAVENOUS PRN
Status: DISCONTINUED | OUTPATIENT
Start: 2022-11-04 | End: 2022-11-04 | Stop reason: SDUPTHER

## 2022-11-04 RX ORDER — HYDROCODONE BITARTRATE AND ACETAMINOPHEN 7.5; 325 MG/1; MG/1
1 TABLET ORAL EVERY 6 HOURS PRN
Status: CANCELLED | OUTPATIENT
Start: 2022-11-04

## 2022-11-04 RX ORDER — PANTOPRAZOLE SODIUM 40 MG/1
40 TABLET, DELAYED RELEASE ORAL
Status: CANCELLED | OUTPATIENT
Start: 2022-11-05

## 2022-11-04 RX ORDER — CLINDAMYCIN PHOSPHATE 900 MG/50ML
900 INJECTION INTRAVENOUS ONCE
Status: COMPLETED | OUTPATIENT
Start: 2022-11-04 | End: 2022-11-04

## 2022-11-04 RX ORDER — POLYETHYLENE GLYCOL 3350 17 G/17G
17 POWDER, FOR SOLUTION ORAL DAILY PRN
Status: CANCELLED | OUTPATIENT
Start: 2022-11-04

## 2022-11-04 RX ORDER — CLOPIDOGREL BISULFATE 75 MG/1
75 TABLET ORAL NIGHTLY
Status: CANCELLED | OUTPATIENT
Start: 2022-11-04

## 2022-11-04 RX ORDER — SODIUM CHLORIDE 9 MG/ML
INJECTION, SOLUTION INTRAVENOUS CONTINUOUS
Status: DISCONTINUED | OUTPATIENT
Start: 2022-11-04 | End: 2022-11-04 | Stop reason: HOSPADM

## 2022-11-04 RX ORDER — DEXAMETHASONE SODIUM PHOSPHATE 4 MG/ML
INJECTION, SOLUTION INTRA-ARTICULAR; INTRALESIONAL; INTRAMUSCULAR; INTRAVENOUS; SOFT TISSUE PRN
Status: DISCONTINUED | OUTPATIENT
Start: 2022-11-04 | End: 2022-11-04 | Stop reason: SDUPTHER

## 2022-11-04 RX ORDER — PROMETHAZINE HYDROCHLORIDE 12.5 MG/1
12.5 TABLET ORAL EVERY 6 HOURS PRN
Qty: 30 TABLET | Refills: 0 | Status: SHIPPED | OUTPATIENT
Start: 2022-11-04 | End: 2022-11-12

## 2022-11-04 RX ORDER — FENTANYL CITRATE 50 UG/ML
INJECTION, SOLUTION INTRAMUSCULAR; INTRAVENOUS PRN
Status: DISCONTINUED | OUTPATIENT
Start: 2022-11-04 | End: 2022-11-04 | Stop reason: SDUPTHER

## 2022-11-04 RX ADMIN — ONDANSETRON 4 MG: 2 INJECTION INTRAMUSCULAR; INTRAVENOUS at 09:43

## 2022-11-04 RX ADMIN — FENTANYL CITRATE 25 MCG: 50 INJECTION, SOLUTION INTRAMUSCULAR; INTRAVENOUS at 08:16

## 2022-11-04 RX ADMIN — PHENYLEPHRINE HYDROCHLORIDE 100 MCG: 10 INJECTION INTRAVENOUS at 08:23

## 2022-11-04 RX ADMIN — LIDOCAINE HYDROCHLORIDE 100 MG: 20 INJECTION, SOLUTION EPIDURAL; INFILTRATION; INTRACAUDAL; PERINEURAL at 07:35

## 2022-11-04 RX ADMIN — PROPOFOL 170 MG: 10 INJECTION, EMULSION INTRAVENOUS at 07:36

## 2022-11-04 RX ADMIN — ROCURONIUM BROMIDE 30 MG: 10 INJECTION, SOLUTION INTRAVENOUS at 07:52

## 2022-11-04 RX ADMIN — ROCURONIUM BROMIDE 30 MG: 10 INJECTION, SOLUTION INTRAVENOUS at 09:30

## 2022-11-04 RX ADMIN — PHENYLEPHRINE HYDROCHLORIDE 100 MCG: 10 INJECTION INTRAVENOUS at 08:29

## 2022-11-04 RX ADMIN — SODIUM CHLORIDE: 9 INJECTION, SOLUTION INTRAVENOUS at 06:35

## 2022-11-04 RX ADMIN — Medication 140 MG: at 07:37

## 2022-11-04 RX ADMIN — FENTANYL CITRATE 25 MCG: 50 INJECTION, SOLUTION INTRAMUSCULAR; INTRAVENOUS at 07:59

## 2022-11-04 RX ADMIN — ROCURONIUM BROMIDE 20 MG: 10 INJECTION, SOLUTION INTRAVENOUS at 08:42

## 2022-11-04 RX ADMIN — FENTANYL CITRATE 25 MCG: 0.05 INJECTION, SOLUTION INTRAMUSCULAR; INTRAVENOUS at 15:28

## 2022-11-04 RX ADMIN — FENTANYL CITRATE 25 MCG: 0.05 INJECTION, SOLUTION INTRAMUSCULAR; INTRAVENOUS at 11:53

## 2022-11-04 RX ADMIN — CLINDAMYCIN PHOSPHATE 900 MG: 900 INJECTION, SOLUTION INTRAVENOUS at 07:27

## 2022-11-04 RX ADMIN — FENTANYL CITRATE 25 MCG: 50 INJECTION, SOLUTION INTRAMUSCULAR; INTRAVENOUS at 07:32

## 2022-11-04 RX ADMIN — SUGAMMADEX 200 MG: 100 INJECTION, SOLUTION INTRAVENOUS at 10:12

## 2022-11-04 RX ADMIN — DEXAMETHASONE SODIUM PHOSPHATE 8 MG: 4 INJECTION, SOLUTION INTRAMUSCULAR; INTRAVENOUS at 07:55

## 2022-11-04 RX ADMIN — FENTANYL CITRATE 25 MCG: 0.05 INJECTION, SOLUTION INTRAMUSCULAR; INTRAVENOUS at 11:15

## 2022-11-04 ASSESSMENT — PAIN DESCRIPTION - PAIN TYPE
TYPE: SURGICAL PAIN

## 2022-11-04 ASSESSMENT — PAIN DESCRIPTION - LOCATION
LOCATION: EAR;THROAT
LOCATION: THROAT
LOCATION: MOUTH;THROAT
LOCATION: THROAT
LOCATION: THROAT;MOUTH

## 2022-11-04 ASSESSMENT — PAIN SCALES - GENERAL
PAINLEVEL_OUTOF10: 4
PAINLEVEL_OUTOF10: 5
PAINLEVEL_OUTOF10: 8

## 2022-11-04 ASSESSMENT — PAIN - FUNCTIONAL ASSESSMENT: PAIN_FUNCTIONAL_ASSESSMENT: 0-10

## 2022-11-04 ASSESSMENT — ENCOUNTER SYMPTOMS: SHORTNESS OF BREATH: 0

## 2022-11-04 NOTE — PROGRESS NOTES
Pt arrived from OR to PACU bay 1. Report received from OR staff. Pt arousable to voice. Pt arrives with nasal airway in place, simple mask in place, infusing 6L oxygen, vitals as charted. CRNA requesting nasal airway to remain in place for extended period of time d/t surgical procedure on tongue.

## 2022-11-04 NOTE — PLAN OF CARE
Problem: Chronic Conditions and Co-morbidities  Goal: Patient's chronic conditions and co-morbidity symptoms are monitored and maintained or improved  Outcome: Adequate for Discharge     Problem: Pain  Goal: Verbalizes/displays adequate comfort level or baseline comfort level  Outcome: Adequate for Discharge     Problem: Discharge Planning  Goal: Discharge to home or other facility with appropriate resources  Outcome: Adequate for Discharge

## 2022-11-04 NOTE — H&P
Date of Surgery Update:  Sandra Pringle was seen, history and physical examination reviewed, and patient examined by me today. There have been no significant clinical changes since the completion of the previous history and physical.    The risk, benefits, and alternatives of the proposed procedure have been explained to the patient (or appropriate guardian) and understanding verbalized. All questions answered. Patient wishes to proceed.     Electronically signed by: Kristie Bray MD,11/4/2022,7:23 AM

## 2022-11-04 NOTE — ANESTHESIA PRE PROCEDURE
Department of Anesthesiology  Preprocedure Note       Name:  Kelvin Cazares   Age:  64 y.o.  :  1945                                          MRN:  [id number]         Date:  2022      Surgeon: Kaitlyn Agustin):  Moises Catherine MD    Procedure: Procedure(s):  GLOSSECTOMY, PARTIAL; WIDE LOCAL EXCISION OF SQUAMOUS CELL CARCINOMA OF RIGHT SIDE OF TONUE WITH C02 LASER WITH FROZEN SECTIONS; POSSIBLE RECONSTRUCTION WITH LOCAL FLAP    Medications prior to admission:   Prior to Admission medications    Medication Sig Start Date End Date Taking?  Authorizing Provider   esomeprazole (NEXIUM) 20 MG delayed release capsule Take 20 mg by mouth every morning (before breakfast)   Yes Historical Provider, MD   aspirin 81 MG EC tablet Take 81 mg by mouth daily   Yes Historical Provider, MD   latanoprost (XALATAN) 0.005 % ophthalmic solution Place 1 drop into the right eye nightly   Yes Historical Provider, MD   magnesium gluconate (MAGONATE) 500 MG tablet Take 500 mg by mouth 2 times daily    Historical Provider, MD   polyethylene glycol (GLYCOLAX) packet Take 17 g by mouth daily as needed for Constipation    Historical Provider, MD   Cholecalciferol (VITAMIN D3) 2000 units CAPS Take 1,000 Units by mouth     Historical Provider, MD   melatonin 5 MG TABS tablet Take by mouth daily    Historical Provider, MD   glyBURIDE (DIABETA) 5 MG tablet Take 5 mg by mouth 2 times daily (with meals)  16   Historical Provider, MD   sitaGLIPtan-metFORMIN (JANUMET)  MG per tablet Take 1 tablet by mouth 2 times daily (with meals)    Historical Provider, MD   clopidogrel (PLAVIX) 75 MG tablet Take 75 mg by mouth nightly     Historical Provider, MD   metoprolol (TOPROL-XL) 50 MG XL tablet Take 50 mg by mouth nightly  16   Historical Provider, MD   Red Yeast Rice 600 MG TABS Take by mouth 2 times daily     Historical Provider, MD   Garlic 125 MG TABS Take 600 mg by mouth 2 times daily     Historical Provider, MD       Current medications:    Current Facility-Administered Medications   Medication Dose Route Frequency Provider Last Rate Last Admin    0.9 % sodium chloride infusion   IntraVENous Continuous Apple Sinha  mL/hr at 11/04/22 0635 New Bag at 11/04/22 0635    ceFAZolin (ANCEF) 2,000 mg in dextrose 5 % 50 mL IVPB (mini-bag)  2,000 mg IntraVENous Once Apple Sinha MD           Allergies:     Allergies   Allergen Reactions    Simvastatin Other (See Comments)     myalgia    Statins Other (See Comments)     Myalgia  myalgia    Levofloxacin Other (See Comments)     Pt. unable to recall symptoms   Pt. 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    Arthritis M19.90    Benign prostatic hyperplasia with urinary obstruction N40.1, N13.8    Calculus of kidney N20.0    Cancer of kidney, unspecified laterality ([facility] [address] C64.9    Cardiac pacemaker in situ Z95.0    Cervical spinal stenosis M48.02    Closed compression fracture of L3 lumbar vertebra S32.030A    Coronary atherosclerosis I25.10    DDD (degenerative disc disease), lumbar M51.36    Diabetes mellitus type 2, noninsulin dependent (HCC) E11.9    Disorder of bone and cartilage M89.9, M94.9    Essential hypertension, benign I10    Facet arthropathy, lumbar M47.816    Flank pain R10.9    Hydronephrosis with urinary obstruction due to ureteral calculus N13.2    Hyperlipidemia E78.5    Hypertension I10    Kidney stone N20.0    Kidney stones N20.0    Mixed hyperlipidemia E78.2    Obstructive sleep apnea G47.33    Osteopenia of both lower legs M85.861, M85.862    Other diseases of lung, not elsewhere classified J98.4    Psychosexual dysfunction with inhibited sexual excitement F52.8    Right sided weakness R53.1    S/P angioplasty with stent Z95.820    Sick sinus syndrome (HCC) I49.5    Sinoatrial node dysfunction (HCC) I49.5    Statin intolerance Z78.9    Tongue lesion K14.8    Trochanteric bursitis of left hip M70.62    Trochanteric bursitis of right hip M70.61    Type 2 diabetes mellitus ([facility] [address] E11.9    Type II or unspecified type diabetes mellitus without mention of complication, not stated as uncontrolled E11.9    Retention of urine R33.9       Past Medical History:        Diagnosis Date    CAD (coronary artery disease)     stent    Chronic low back pain     Constipation     d/t opoids use--pt discontinued    Depression     Diabetic acidosis, type II ([address]     History of bradycardia     Hyperlipidemia     Hypertension     Kidney stones     Pacemaker 2011    Medtronic model #ADDR01    Prolonged emergence from general anesthesia     Risk for falls     uses a walker/cane when out of house    Self-catheterizes urinary bladder     Since 2017 possibly r/t nerve damage in back    Wears glasses        Past Surgical History:        Procedure Laterality Date    CARDIAC SURGERY      CORONARY ANGIOPLASTY WITH STENT PLACEMENT     COLONOSCOPY      CYSTOSCOPY      stents    EYE SURGERY Right     cataract removed right eye    FRACTURE SURGERY Left     fractured ankle    HERNIA REPAIR  90's    ventral hernia w/ mesh    LITHOTRIPSY  06/09/2016    X 4    PACEMAKER PLACEMENT  2011    Medtronic model #ADDR01    RHINOPLASTY  70's    TONSILLECTOMY AND ADENOIDECTOMY         Social History:    Social History     Tobacco Use    Smoking status: Former     Packs/day: 1.00     Years: 18.00     Pack years: 18.00     Types: Cigarettes     Start date: 5/10/1960     Quit date: 5/10/1978     Years since quittin.5    Smokeless tobacco: Never   Substance Use Topics    Alcohol use: No     Alcohol/week: 0.0 standard drinks Counseling given: Not Answered      Vital Signs (Current):   Vitals:    11/03/22 1108 11/04/22 0614 11/04/22 0626   BP:  (!) 145/94 136/77   Pulse:  72 72   Resp:  13 12   Temp:  (!) 96.4 °F (35.8 °C)    TempSrc:  Temporal    SpO2:  99%    Weight: 200 lb (90.7 kg) 196 lb (88.9 kg)    Height: 5' 7\" (1.702 m) 5' 7\" (1.702 m)                                               BP Readings from Last 3 Encounters:   11/04/22 136/77   10/14/22 129/74   09/08/22 123/79       NPO Status: Time of last liquid consumption: 1800                        Time of last solid consumption: 1800                        Date of last liquid consumption: 11/03/22                        Date of last solid food consumption: 11/03/22    BMI:   Wt Readings from Last 3 Encounters:   11/04/22 196 lb (88.9 kg)   10/14/22 200 lb (90.7 kg)   01/18/19 190 lb (86.2 kg)     Body mass index is 30.7 kg/m².     CBC:   Lab Results   Component Value Date/Time    WBC 14.5 03/21/2018 09:30 AM    RBC 4.55 03/21/2018 09:30 AM    HGB 13.8 03/21/2018 09:30 AM    HCT 41.1 03/21/2018 09:30 AM    MCV 90.3 03/21/2018 09:30 AM    RDW 14.4 03/21/2018 09:30 AM     03/21/2018 09:30 AM       CMP:   Lab Results   Component Value Date/Time     03/21/2018 09:30 AM    K 4.1 03/21/2018 09:30 AM    CL 98 03/21/2018 09:30 AM    CO2 20 03/21/2018 09:30 AM    BUN 16 03/21/2018 09:30 AM    CREATININE 1.0 03/21/2018 09:30 AM    GFRAA >60 03/21/2018 09:30 AM    LABGLOM >60 03/21/2018 09:30 AM    GLUCOSE 185 03/21/2018 09:30 AM    CALCIUM 8.9 03/21/2018 09:30 AM       POC Tests:   Recent Labs     11/04/22  0632   POCGLU 146*       Coags: No results found for: PROTIME, INR, APTT    HCG (If Applicable): No results found for: PREGTESTUR, PREGSERUM, HCG, HCGQUANT     ABGs: No results found for: PHART, PO2ART, SYQ7YNV, EMW8BLG, BEART, Q9KODRBE     Type & Screen (If Applicable):  No results found for: LABABO, LABRH    Drug/Infectious Status (If Applicable):  No results found for: HIV, HEPCAB    COVID-19 Screening (If Applicable): No results found for: COVID19        Anesthesia Evaluation  Patient summary reviewed and Nursing notes reviewed no history of anesthetic complications:   Airway: Mallampati: I  TM distance: >3 FB   Neck ROM: full  Mouth opening: > = 3 FB   Dental: normal exam         Pulmonary:   (+) sleep apnea:      (-) asthma and shortness of breath                           Cardiovascular:    (+) hypertension:, pacemaker: pacemaker, CAD:, hyperlipidemia    (-)  angina          Echocardiogram reviewed                  Neuro/Psych:   (+) depression/anxiety    (-) CVA           GI/Hepatic/Renal:        (-) GERD and liver disease       Endo/Other:    (+) DiabetesType II DM, , .    (-) hypothyroidism               Abdominal:             Vascular:     - PVD. Other Findings:           Anesthesia Plan      general     ASA 3       Induction: intravenous. MIPS: Postoperative opioids intended and Prophylactic antiemetics administered. Anesthetic plan and risks discussed with patient. Use of blood products discussed with patient whom. Plan discussed with CRNA.                     Maco Tavares MD   11/4/2022

## 2022-11-04 NOTE — PROGRESS NOTES
Pt transported to ICU by RN and transporter. Pt assisted to inpatient bed by RN; bedside report given to ICU KAVON Christine v/u. Pt wife with pt; all belongings with pt as well.

## 2022-11-04 NOTE — OP NOTE
Operative Note      Patient: Senthil Contreras  YOB: 1945  MRN: [id number]    Date of Procedure: 11/4/2022    Pre-Op Diagnosis: C02.3 SQUAMOUS CELL CARCINOMA OF THE TONGUE, RIGHT VENTROLATERAL    Post-Op Diagnosis: Same       Procedure(s):  GLOSSECTOMY, PARTIAL; WIDE LOCAL EXCISION OF SQUAMOUS CELL CARCINOMA OF RIGHT VENTROLATERAL TONGUE WITH C02 LASER WITH FROZEN SECTIONS WITH CLOSURE    Surgeon(s):  Chalo Pacheco MD    Assistant:   Surgical Assistant: Elizabeth Hatfield    Anesthesia: General    Estimated Blood Loss (mL): less than 50 (fifty)    Complications: None    Specimens:   ID Type Source Tests Collected by Time Destination   A : SQUAMOUS CELL CARCINOMA RIGHT LATERAL TONGUE FOR FROZEN SECTION - DOUBLE LOOP ON ANTERIOR MARGIN, SHORT STITCH ON SUPERIOR MARGIN, LONG SUTURE ON POSTERIOR MARGIN Tissue Tissue SURGICAL PATHOLOGY Chalo Pacheco MD 11/4/2022 7213    B : ADDITIONAL ANTERIOR MARGIN RIGHT TONGUE FOR FROZEN SECTION -  Tissue Tissue SURGICAL PATHOLOGY Chalo Pacheco MD 11/4/2022 0910    C : ADDITIONAL INFERIOR  MARGIN RIGHT TONGUE FROZEN SECTION Tissue Tissue SURGICAL PATHOLOGY Chalo Pacheco MD 11/4/2022 7456        Implants:  * No implants in log *      Drains: * No LDAs found *    Findings: There were multiple leukoplakia lesions and tumor mass on the right lateral and ventrolateral tongue, over an area of 1.5 x 4 cm, excised with margin of 1 cm  creating a surgical defect of 3.5 x 7 cm on the lateral and ventrolateral area of the tongue but primarily on the ventral surface of the tongue. Frozen section returned as squamous cell carcinoma with all margins clear but close anterior and inferior margins. Additional margins were taken at the anterior and inferior area and these were negative. Detailed Description of Procedure: The adequately prepared patient was taken to the operating room and placed in the supine position on the operating room table.   The attending anesthetist induced an adequate level of general anesthesia and intubated the patient with a laser proof oroendotracheal tube, and monitored the patient maintaining an adequate level of general anesthesia throughout the case. A time out was held and the patient's identify and procedure were confirmed. The patient was then positioned and draped in a sterile manner. The CO2 laser handpiece was used for cutting and dissection. The gross tumor/lesion was delineated and an incision marked with the CO2 laser with a 1-1.2 cm margin in all directions. This incision was deepened into the muscle layer of the tongue with the laser creating a through and through partial glossectomy excision. The lesion was then undermined with a cuff of normal appearing muscle. The partial glossectomy specimen was removed and oriented for the pathologist using silk sutures. The excision defect was then cauterized in multiple places with bipolar electrocautery obtaining hemostasis. The defect was then approximated with 3-0 Vicryl deep sutures to approximate the muscle layer and 3-0 Vicryl interrupted buried sutures to approximate the submucosal layer and 3-0 chromic catgut to approximate the mucosa.       Electronically signed by Gold Medina MD on 11/4/2022 at 10:20 AM

## 2022-11-04 NOTE — PROGRESS NOTES
Pt stable and able to be transferred from PACU to room 3901. ICU called and notified that pt is being transferred out of PACU and to room.

## 2022-11-04 NOTE — ANESTHESIA POSTPROCEDURE EVALUATION
Department of Anesthesiology  Postprocedure Note    Patient: Tonie Ragsdale  MRN: [id number]  YOB: 1945  Date of evaluation: 11/4/2022      Procedure Summary     Date: 11/04/22 Room / Location: [address]    Anesthesia Start: 0730 Anesthesia Stop: 8854    Procedure: GLOSSECTOMY, PARTIAL; WIDE LOCAL EXCISION OF SQUAMOUS CELL CARCINOMA OF RIGHT VENTROLATERAL TONUE WITH C02 LASER WITH FROZEN SECTIONS WITH CLOSURE (Mouth) Diagnosis:       Cancer of lateral margin of anterior two-thirds of tongue (HCC)      (C02.3 SQUAMOUS CELL CARCINOMA OF THE TONGUE, RIGHT LATERAL)    Surgeons: Fidel Garcia MD Responsible Provider: Rodríguez Manjarrez MD    Anesthesia Type: general ASA Status: 3          Anesthesia Type: No value filed.     Panda Phase I: Panda Score: 9    Panda Phase II:        Anesthesia Post Evaluation    Patient location during evaluation: PACU  Patient participation: complete - patient participated  Level of consciousness: awake and alert  Airway patency: patent  Nausea & Vomiting: no nausea and no vomiting  Complications: no  Cardiovascular status: hemodynamically stable  Respiratory status: acceptable  Hydration status: stable  Multimodal analgesia pain management approach

## 2022-11-04 NOTE — BRIEF OP NOTE
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, RIGHT VENTROLATERAL    Post-Op Diagnosis: Same       Procedure(s):  GLOSSECTOMY, PARTIAL; WIDE LOCAL EXCISION OF SQUAMOUS CELL CARCINOMA OF RIGHT VENTROLATERAL TONGUE WITH C02 LASER WITH FROZEN SECTIONS WITH CLOSURE    Surgeon(s):  Alice Ho MD    Assistant:  Surgical Assistant: Jani Morris    Anesthesia: General    Estimated Blood Loss (mL): less than 50 (fifty)    Complications: None    Specimens:   ID Type Source Tests Collected by Time Destination   A : SQUAMOUS CELL CARCINOMA RIGHT LATERAL TONGUE FOR FROZEN SECTION - DOUBLE LOOP ON ANTERIOR MARGIN, SHORT STITCH ON SUPERIOR MARGIN, LONG SUTURE ON POSTERIOR MARGIN Tissue Tissue SURGICAL PATHOLOGY Alice Ho MD 11/4/2022 1036    B : ADDITIONAL ANTERIOR MARGIN RIGHT TONGUE FOR FROZEN SECTION -  Tissue Tissue SURGICAL PATHOLOGY Alice Ho MD 11/4/2022 0910    C : ADDITIONAL INFERIOR  MARGIN RIGHT TONGUE FROZEN SECTION Tissue Tissue SURGICAL PATHOLOGY Alice Ho MD 11/4/2022 1573        Implants:  * No implants in log *      Drains: * No LDAs found *    Findings:  multiple leukoplakia lesions and tumor mass on the right lateral and ventrolateral tongue, over an area of 1.5 x 4 cm, excised with margin of 1 cm  creating a surgical defect of 3.5 x 7 cm on the ventrolateral area of the tongue but primarily on the ventral surface of the tongue       Electronically signed by Alice Ho MD on 11/4/2022 at 10:12 AM

## 2022-11-04 NOTE — PROGRESS NOTES
Time out done, 02 on @ 2 l/min per n/c, then versed & fentanyl IV given, then Dr Daylin Linares completed left intrascalene nerve block, patient tolerated procedure well.

## 2022-11-04 NOTE — PROGRESS NOTES
HISTORY:  Patient doing well. Feels pain throat more so than tongue. Able to swallow soft pudding consistency food and fluids without difficulty. Denied dyspnea. Voice is normal.        EXAMINATION:  Has a low grade fever to 99.4 at 1600. VSS  NARD, no stridor  Voice:  normal with minimally decreased articulation/intelligibility. ORAL CAVITY: Tongue with moderate swelling but not excessive, with no airway obstruction. Repair intact. Minimal bloody oozing at repair site. IMPRESSION / DIAGNOSES / ORDERS:   Stable, post op      RECOMMENDATIONS / PLAN:   Discharge home. Patient agreed to return to ER if has any difficulty breathing or increasing pain.      Otherwise RTO in one week

## 2022-11-04 NOTE — PROGRESS NOTES
Dr. Huerta [place] bedside to evaluate pt; ok to remove nasal airway per MD; pt tolerated well. Pt remains with 3L oxgyen in place via nasal cannula, vitals as charted.

## 2022-11-04 NOTE — CARE COORDINATION
Discharge Planning Assessment:     Patient admitted as Observation/OPIB with an anticipated short hospitalization length of stay. Chart reviewed and it appears that patient has minimal needs for discharge at this time. Discussed with patients nurse and requested that case management be notified if discharge needs are identified. *Case management will continue to follow progress and update discharge plan as needed.     Electronically signed by Edy Charles RN on 11/4/2022 at 3:26 PM

## 2022-11-05 ENCOUNTER — TELEPHONE (OUTPATIENT)
Dept: ENT CLINIC | Age: 77
End: 2022-11-05

## 2022-11-05 NOTE — TELEPHONE ENCOUNTER
Phone call to patient at 1:10 PM.  Spoke to patient and wife. They reported he has some swelling of the tongue and his throat remains sore. He has been able to eat soft food and liquids. Pain medicine is crushed and taken with applesauce successfully. He has had no fever. I advised him to stick with liquids and soft food of pudding or mashed potato consistency. I advised him to use Ensure or boost with ice cream to make a milkshake or slurry. I advised him he can use Chloraseptic to help with the throat pain. I reiterated that this will take some time for the tongue swelling to reduce and sore throat to resolve. I did advise that some of the apparent swelling of the tongue is due to the excision and closure causing some bunching of the tissues. I advised him to call the office number if symptoms worsen.   He has an appointment for follow-up next Friday at 1 PM.

## 2022-11-14 ENCOUNTER — OFFICE VISIT (OUTPATIENT)
Dept: ENT CLINIC | Age: 77
End: 2022-11-14

## 2022-11-14 VITALS — WEIGHT: 197.2 LBS | BODY MASS INDEX: 30.89 KG/M2

## 2022-11-14 DIAGNOSIS — C02.3 MALIGNANT NEOPLASM OF ANTERIOR TWO-THIRDS OF TONGUE (HCC): Primary | ICD-10-CM

## 2022-11-14 PROCEDURE — 99024 POSTOP FOLLOW-UP VISIT: CPT | Performed by: OTOLARYNGOLOGY

## 2022-11-14 RX ORDER — FOLIC ACID 0.8 MG
TABLET ORAL
COMMUNITY

## 2022-11-14 NOTE — PROGRESS NOTES
POST-OP NOTE    Chief Complaint   Patient presents with    Post-Op Check       HISTORY:     POD #10, post right partial glossectomy, excision of tongue cancer. , on 11/04/2022. He feels his tongue is still swollen. 4 days drinking soup and moved to soft food and still on soft food. therwise, doing well, with no other current complaints. EXAMINATION:         WDWN, awake and alert, with no evidence of distress. Tongue excision site appears to be healing well, repair intact. PATHOLOGY:  FINAL DIAGNOSIS:     A. Right lateral tongue, excision:   -Invasive squamous cell carcinoma with patchy keratinization, moderately   differentiated. -Margins not involved. B.  Right tongue, additional anterior margin:   -No diagnostic abnormality.   -Negative for dysplasia or malignancy. C.  Right tongue, additional inferior margin:   -No diagnostic abnormality.   -Negative for dysplasia or malignancy. Michael Brown was seen today for post-op check. Diagnoses and all orders for this visit:    Malignant neoplasm of anterior two-thirds of tongue ([address]         RECOMMENDATIONS / PLAN:   Kenny Valencia was advised to follow the post-op instructions in the after visit summary given after surgery. Kenny Valencia was advised to continue post-op medications as prescribed. Follow-up  Return in about 17 days (around 12/1/2022) for recheck/follow-up, and sooner if condition worsens.

## 2022-11-20 NOTE — DISCHARGE SUMMARY
Discharge Summary    Date: 11/20/2022  Patient Name: Rizwan Valencia    YOB: 1945     Age: 64 y.o. 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 [facility] AND [facility] Stay  Narrative of Hospital Course:  Unremarkable. Consultants:  None    Surgeries/procedures Performed:      Treatments:    Analgesia and IV Hydration    Vicodin    Discharge Plan/Disposition:  Home    Hospital/Incidental Findings Requiring Follow Up:    Patient Instructions:    Diet: Regular Diet    Activity:Activity as Tolerated  For number of days (if applicable): 10      Other Instructions: See post op instructions from surgery AVS.  Follow up:  RTO 11/11/2022. Provider Follow-Up:   No follow-ups on file. Significant Diagnostic Studies:    Recent Labs:  Admission on 11/04/2022, Discharged on 11/04/2022  POC Glucose                                   Date: 11/04/2022  Value: 146 (A)     Ref range: 70 - 99 mg/dl      Status: Final  Performed on                                  Date: 11/04/2022  Value: ACCU-CHEK     Status: Final  POC Glucose                                   Date: 11/04/2022  Value: 191 (A)     Ref range: 70 - 99 mg/dl      Status: Final  Performed on                                  Date: 11/04/2022  Value: ACCU-CHEK     Status: Final  ------------    Radiology last 7 days:  No results found.      Pending Labs     Order Current Status    Surgical Pathology Collected (11/04/22 5749)    Surgical Pathology Collected (11/04/22 0910)        Discharge Medications    Discharge Medication List as of 11/4/2022  6:33 PM    START taking these medications    HYDROcodone-acetaminophen (NORCO) 7.5-325 MG per tablet  Take 1 tablet by mouth every 6 hours as needed for Pain for up to 7 days. Intended supply: 7 days.  Take lowest dose possible to manage pain, Disp-28 tablet, R-0  Normal    promethazine (PHENERGAN) 12.5 MG tablet  Take 1 tablet by mouth every 6 hours as needed for Nausea, Disp-30 tablet, R-0  Normal          Discharge Medication List as of 11/4/2022  6:33 PM        Discharge Medication List as of 11/4/2022  6:33 PM    CONTINUE these medications which have NOT CHANGED    esomeprazole (NEXIUM) 20 MG delayed release capsule  Take 20 mg by mouth every morning (before breakfast)  Historical Med    aspirin 81 MG EC tablet  Take 81 mg by mouth daily  Historical Med    latanoprost (XALATAN) 0.005 % ophthalmic solution  Place 1 drop into the right eye nightly  Historical Med    polyethylene glycol (GLYCOLAX) packet  Take 17 g by mouth daily as needed for Constipation  Historical Med    Cholecalciferol (VITAMIN D3) 2000 units CAPS  Take 1,000 Units by mouth   Historical Med    melatonin 5 MG TABS tablet  Take by mouth daily  Historical Med    magnesium gluconate (MAGONATE) 500 MG tablet  Take 500 mg by mouth 2 times daily  Historical Med    glyBURIDE (DIABETA) 5 MG tablet  Take 5 mg by mouth 2 times daily (with meals)   Historical Med    sitaGLIPtan-metFORMIN (JANUMET)  MG per tablet  Take 1 tablet by mouth 2 times daily (with meals)  Historical Med    clopidogrel (PLAVIX) 75 MG tablet  Take 75 mg by mouth nightly   Historical Med    metoprolol (TOPROL-XL) 50 MG XL tablet  Take 50 mg by mouth nightly   Historical Med    Red Yeast Rice 600 MG TABS  Take by mouth 2 times daily   Historical Med    Garlic 391 MG TABS  Take 600 mg by mouth 2 times daily   Historical Med          Discharge Medication List as of 11/4/2022  6:33 PM        Time Spent on Discharge:  30 minutes were spent in patient examination, evaluation, counseling as well as medication reconciliation, prescriptions for required medications, discharge plan, and follow up.     Electronically signed by Kristie Bray MD on 11/20/22 at 10:56 AM EST

## 2022-12-01 ENCOUNTER — OFFICE VISIT (OUTPATIENT)
Dept: ENT CLINIC | Age: 77
End: 2022-12-01

## 2022-12-01 VITALS — DIASTOLIC BLOOD PRESSURE: 80 MMHG | HEART RATE: 81 BPM | SYSTOLIC BLOOD PRESSURE: 144 MMHG | TEMPERATURE: 97.1 F

## 2022-12-01 DIAGNOSIS — C02.3 MALIGNANT NEOPLASM OF ANTERIOR TWO-THIRDS OF TONGUE (HCC): ICD-10-CM

## 2022-12-01 DIAGNOSIS — Z09 POSTOP CHECK: Primary | ICD-10-CM

## 2022-12-01 PROCEDURE — 99024 POSTOP FOLLOW-UP VISIT: CPT | Performed by: OTOLARYNGOLOGY

## 2022-12-01 NOTE — PROGRESS NOTES
POST-OP NOTE    Chief Complaint   Patient presents with    Post-Op Check     Cancer of tongue          HISTORY:     POD #27,  Post post right partial glossectomy, excision of tongue cancer, on 11/04/2022 with all margins clear of disease. Patient stated that he has some tingling in the tip of his tongue but he has bitten his tongue several times. He stated he has no pain in the operative area. He does feel something in the bottom of the front of the tongue with tightness. He stated his speech is almost normal and his ability to swallow is normal.  He denied any other current problems. Patient was here today with his wife. EXAMINATION:        Vitals:    12/01/22 0829   BP: (!) 144/80   Pulse: 81   Temp: 97.1 °F (36.2 °C)      WDWN, awake and alert, with no evidence of distress. Oral Cavity:  Tongue appears to be healing with no evidence of persistent disease. There is a small ulceration on the right anterior tip of the tongue consistent with a healing bite wound. There was a mild mucosal band at the right ventral anterior tongue with mild tethering of the tongue in that area. The remainder of the oral cavity and the oropharynx appeared to be normal.      IMPRESSION  Keri Olivas was seen today for post-op check. Diagnoses and all orders for this visit:    Postop check    Malignant neoplasm of anterior two-thirds of tongue ([address]         RECOMMENDATIONS / PLAN:   Return in about 1 month (around 1/1/2023) for recheck/follow-up, and sooner if condition worsens.

## 2022-12-04 PROBLEM — Z98.890 POST-OPERATIVE STATE: Status: RESOLVED | Noted: 2022-11-04 | Resolved: 2022-12-04

## 2023-01-04 ENCOUNTER — OFFICE VISIT (OUTPATIENT)
Dept: ENT CLINIC | Age: 78
End: 2023-01-04
Payer: MEDICARE

## 2023-01-04 VITALS
HEIGHT: 67 IN | HEART RATE: 74 BPM | SYSTOLIC BLOOD PRESSURE: 114 MMHG | DIASTOLIC BLOOD PRESSURE: 68 MMHG | BODY MASS INDEX: 31.39 KG/M2 | WEIGHT: 200 LBS | TEMPERATURE: 98.4 F

## 2023-01-04 DIAGNOSIS — C02.3 MALIGNANT NEOPLASM OF ANTERIOR TWO-THIRDS OF TONGUE (HCC): Primary | Chronic | ICD-10-CM

## 2023-01-04 PROCEDURE — 1123F ACP DISCUSS/DSCN MKR DOCD: CPT | Performed by: OTOLARYNGOLOGY

## 2023-01-04 PROCEDURE — 3074F SYST BP LT 130 MM HG: CPT | Performed by: OTOLARYNGOLOGY

## 2023-01-04 PROCEDURE — 99213 OFFICE O/P EST LOW 20 MIN: CPT | Performed by: OTOLARYNGOLOGY

## 2023-01-04 PROCEDURE — 3078F DIAST BP <80 MM HG: CPT | Performed by: OTOLARYNGOLOGY

## 2023-01-04 NOTE — PROGRESS NOTES
POST-OP NOTE    Chief Complaint   Patient presents with    Other       HISTORY:     POD 61, Post post right partial glossectomy, excision of tongue cancer, on 11/04/2022. Patient stated that he has four concerns:    Frenulum of tongue seems weird. Tongue seem to be more on the right side and always under my teeth. Tip of tongue is numb feels like a 9 volt battery is touching my tongue. Tongue seems swollen and I'm biting my tongue. EXAMINATION:        Vitals:    01/04/23 0921   BP: 114/68   Pulse: 74   Temp: 98.4 °F (36.9 °C)      WDWN, awake and alert, with no evidence of distress. Oral cavity:  There was a white spot on right tip of tongue with induration of underlying tissues. The excision site appeared to be well healed with LB. There was tethering of the tongue at the frenulum with apparent scar contracture. COUNSELING:  I advised Divine Salas that the frenulum of the tongue is altered with an apparent scar contracture and some tethering of the tongue in that area. Also advised him that the tongue is slightly shifted to the right probably due to scar contractures in the healing excision site. I advised him to avoid biting the tongue and to eat and chew very carefully. I advised him that the numbness of the tip of the tongue may be related to the healing nerve. Advised him that there is some swelling in the tongue and that he should try to avoid biting his tongue. Advised him that if the submucosal induration at the right tip of the tongue as well as the leukoplakia does not resolve, biopsy is indicated. Jenifer Garrett  was seen today for other. Diagnoses and all orders for this visit:    Malignant neoplasm of anterior two-thirds of tongue ([facility] [address]  Comments:  post excision, LB. RECOMMENDATIONS / PLAN:   I discussed each of his concerns. Divine Salas was advised to follow the post-op instructions in the after visit summary given after surgery.   Divine Salas was advised to continue post-op medications as prescribed. See Patient Instructions on file for this visit. Follow-up  Return in about 1 month (around 2/4/2023) for recheck/follow-up, and sooner if condition worsens.

## 2023-02-02 ENCOUNTER — OFFICE VISIT (OUTPATIENT)
Dept: ENT CLINIC | Age: 78
End: 2023-02-02

## 2023-02-02 VITALS
SYSTOLIC BLOOD PRESSURE: 131 MMHG | WEIGHT: 195 LBS | HEART RATE: 81 BPM | DIASTOLIC BLOOD PRESSURE: 80 MMHG | BODY MASS INDEX: 30.61 KG/M2 | HEIGHT: 67 IN | TEMPERATURE: 97.4 F

## 2023-02-02 DIAGNOSIS — Z09 POSTOP CHECK: Primary | ICD-10-CM

## 2023-02-02 PROCEDURE — 99024 POSTOP FOLLOW-UP VISIT: CPT | Performed by: OTOLARYNGOLOGY

## 2023-02-02 NOTE — PROGRESS NOTES
POST-OP NOTE    Chief Complaint   Patient presents with    Post-Op Check       HISTORY:     Post right partial glossectomy, excision of tongue cancer, on 11/04/2022. I'm not biting my tongue as much as I was, so it has healed up. The tip of my tongue is still numb. Otherwise, doing well, with no other complaints. EXAMINATION:        Vitals:    02/02/23 0804   BP: 131/80   Pulse: 81   Temp: 97.4 °F (36.3 °C)      WDWN, awake and alert, with no evidence of distress. Oral cavity:  Tongue excision site appears to be well-healed with no evidence of disease. Remainder tongue is normal.  Remainder of oral cavity normal.  Neck:  No masses or tenderness. Nodes:  No cervical lymphadenopathy detected. Fred Persaud was seen today for post-op check. Diagnoses and all orders for this visit:    Postop check  Comments:  Appears to be doing well postop with no evidence of complications. RECOMMENDATIONS / PLAN:   Follow-up visits for recheck and surveillance every month for the first year, every 2 months for second year, every 3 months for the third year, every 4 months for the fourth year, and then every 6 months for the fifth year and then as needed for ENT problems. Return in about 1 month (around 3/2/2023) for recheck/follow-up, and sooner if condition worsens.

## 2023-04-20 ENCOUNTER — OFFICE VISIT (OUTPATIENT)
Dept: ENT CLINIC | Age: 78
End: 2023-04-20
Payer: MEDICARE

## 2023-04-20 VITALS
HEART RATE: 82 BPM | WEIGHT: 199 LBS | BODY MASS INDEX: 31.23 KG/M2 | HEIGHT: 67 IN | DIASTOLIC BLOOD PRESSURE: 81 MMHG | SYSTOLIC BLOOD PRESSURE: 135 MMHG | OXYGEN SATURATION: 98 % | TEMPERATURE: 97.7 F

## 2023-04-20 DIAGNOSIS — C02.3 MALIGNANT NEOPLASM OF ANTERIOR TWO-THIRDS OF TONGUE (HCC): Primary | Chronic | ICD-10-CM

## 2023-04-20 PROCEDURE — 3079F DIAST BP 80-89 MM HG: CPT | Performed by: OTOLARYNGOLOGY

## 2023-04-20 PROCEDURE — 1123F ACP DISCUSS/DSCN MKR DOCD: CPT | Performed by: OTOLARYNGOLOGY

## 2023-04-20 PROCEDURE — 3075F SYST BP GE 130 - 139MM HG: CPT | Performed by: OTOLARYNGOLOGY

## 2023-04-20 PROCEDURE — 99212 OFFICE O/P EST SF 10 MIN: CPT | Performed by: OTOLARYNGOLOGY

## 2023-04-20 ASSESSMENT — ENCOUNTER SYMPTOMS
SINUS PAIN: 0
SORE THROAT: 0
RHINORRHEA: 0

## 2023-04-20 NOTE — PROGRESS NOTES
was seen today for cancer. Diagnoses and all orders for this visit:    Malignant neoplasm of anterior two-thirds of tongue ([facility] [address]  Comments:  LB. RECOMMENDATIONS/PLAN      Return in about 7 weeks (around 6/5/2023) for recheck/follow-up, and sooner if condition worsens. TIME:  I spent a total of at least 15 minutes with this patient for history, physical examination, counseling, reviewing the medical record, and documenting the visit.

## 2023-07-10 ENCOUNTER — OFFICE VISIT (OUTPATIENT)
Dept: ENT CLINIC | Age: 78
End: 2023-07-10
Payer: MEDICARE

## 2023-07-10 VITALS
HEART RATE: 65 BPM | HEIGHT: 67 IN | OXYGEN SATURATION: 97 % | SYSTOLIC BLOOD PRESSURE: 135 MMHG | DIASTOLIC BLOOD PRESSURE: 78 MMHG | BODY MASS INDEX: 31.08 KG/M2 | WEIGHT: 198 LBS | TEMPERATURE: 97.1 F

## 2023-07-10 DIAGNOSIS — R22.1 MASS OF RIGHT SIDE OF NECK: ICD-10-CM

## 2023-07-10 DIAGNOSIS — C02.3 MALIGNANT NEOPLASM OF ANTERIOR TWO-THIRDS OF TONGUE (HCC): Primary | Chronic | ICD-10-CM

## 2023-07-10 PROCEDURE — 99212 OFFICE O/P EST SF 10 MIN: CPT | Performed by: OTOLARYNGOLOGY

## 2023-07-10 PROCEDURE — 1123F ACP DISCUSS/DSCN MKR DOCD: CPT | Performed by: OTOLARYNGOLOGY

## 2023-07-10 PROCEDURE — 3075F SYST BP GE 130 - 139MM HG: CPT | Performed by: OTOLARYNGOLOGY

## 2023-07-10 PROCEDURE — 3078F DIAST BP <80 MM HG: CPT | Performed by: OTOLARYNGOLOGY

## 2023-07-10 NOTE — PROGRESS NOTES
Chief Complaint   Patient presents with    Cancer     Tongue, here for recheck and follow-up. HISTORY OF PRESENT ILLNESS    Ayanna Greenberg is a 76 y.o. male here for recheck and follow up of tongue cancer, squamous cell carcinoma lateral margin of right anterior tongue. He is post right partial glossectomy, excision of tongue cancer, using CO2 laser with closure, on 11/4/2022. He has been LB since. He denied any recent pain or drainage from the tongue or any change since last visit. Patient stated that he recently got new upper dentures and is currently getting used to those. REVIEW OF SYSTEMS  Constitutional:  Denied fever and chills. ENT/sinus:  Denied pain and drainage. EXAMINATION    WDWN, NAD  Throat,  OC/ OP:  tongue well healed with LB. Otherwise, normal with no lesions. Neck:  right SMG enlarged versus lymphadenopathy. NT. No other masses. Nodes:  right SM mass as above. No other lymphadenopathy. Aureliano Washington / Hugo Franco / Jonathan Pak:   [place] Yeyo was seen today for cancer. Diagnoses and all orders for this visit:    Malignant neoplasm of anterior two-thirds of tongue ([address])  Comments:  LB  Orders:  -     CT SOFT TISSUE NECK W CONTRAST    Mass of right side of neck  -     CT SOFT TISSUE NECK W CONTRAST        RECOMMENDATIONS / PLAN:   CT neck to evaluate possible neck mass versus SMG and to reassess the tongue. Return in about 1 month (around 8/10/2023) for recheck/follow-up, and sooner if condition worsens.

## 2023-07-26 ENCOUNTER — HOSPITAL ENCOUNTER (OUTPATIENT)
Dept: CT IMAGING | Age: 78
Discharge: HOME | End: 2023-07-26
Attending: OTOLARYNGOLOGY
Payer: MEDICARE

## 2023-07-26 DIAGNOSIS — C02.3 MALIGNANT NEOPLASM OF ANTERIOR TWO-THIRDS OF TONGUE (HCC): Chronic | ICD-10-CM

## 2023-07-26 DIAGNOSIS — R22.1 MASS OF RIGHT SIDE OF NECK: ICD-10-CM

## 2023-07-26 PROCEDURE — 6360000004 HC RX CONTRAST MEDICATION: Performed by: OTOLARYNGOLOGY

## 2023-07-26 PROCEDURE — 70491 CT SOFT TISSUE NECK W/DYE: CPT

## 2023-07-26 RX ADMIN — IOPAMIDOL 75 ML: 755 INJECTION, SOLUTION INTRAVENOUS at 14:52

## 2023-08-10 ENCOUNTER — OFFICE VISIT (OUTPATIENT)
Dept: ENT CLINIC | Age: 78
End: 2023-08-10
Payer: MEDICARE

## 2023-08-10 VITALS
DIASTOLIC BLOOD PRESSURE: 78 MMHG | WEIGHT: 193 LBS | HEART RATE: 68 BPM | OXYGEN SATURATION: 97 % | SYSTOLIC BLOOD PRESSURE: 141 MMHG | TEMPERATURE: 98 F | HEIGHT: 67 IN | BODY MASS INDEX: 30.29 KG/M2

## 2023-08-10 DIAGNOSIS — C02.3 MALIGNANT NEOPLASM OF ANTERIOR TWO-THIRDS OF TONGUE (HCC): Primary | Chronic | ICD-10-CM

## 2023-08-10 PROCEDURE — 3078F DIAST BP <80 MM HG: CPT | Performed by: OTOLARYNGOLOGY

## 2023-08-10 PROCEDURE — 3077F SYST BP >= 140 MM HG: CPT | Performed by: OTOLARYNGOLOGY

## 2023-08-10 PROCEDURE — 1123F ACP DISCUSS/DSCN MKR DOCD: CPT | Performed by: OTOLARYNGOLOGY

## 2023-08-10 PROCEDURE — 99213 OFFICE O/P EST LOW 20 MIN: CPT | Performed by: OTOLARYNGOLOGY

## 2023-08-10 NOTE — PROGRESS NOTES
Chief Complaint   Patient presents with    Cancer     Right oral tongue, post excision. HISTORY OF PRESENT ILLNESS    Miguelito Breaux is a 76 y.o. male here for recheck and follow up of tongue cancer, squamous cell carcinoma lateral margin of right anterior tongue. He is post right partial glossectomy, excision of tongue cancer, using CO2 laser with closure, on 11/4/2022. He has been LB since. Patient recently went to his dentist and had dental hygiene performed. He stated that he developed a sore throat after that visit and has been wearing a mask recently because of that. REVIEW OF SYSTEMS  Constitutional:  Denied fever and chills. ENT/sinus:  Denied pain and drainage. EXAMINATION    WDWN, NAD  Ears:  TMs, EACs, mastoids and pinnae were normal.    Nose:  Normal with no lesions. Sinuses:  NT x 4   Throat,  OC/ OP: Postsurgical changes right side of tongue. No induration or masses of the tongue. Some deformity of the right side of the tip of the tongue, with no induration or ulceration. Otherwise normal with no lesions. Neck:  NT, No masses. Trachea midline. Nodes:  No lymphadenopathy. REVIEW OF IMAGES:         I independently interpreted the images of the CT scan of the neck, showing no evidence of recurrence of primary cancer and no evidence of cervical lymphadenopathy. The submandibular glands appear to be normal bilaterally. Patient was advised of the results. CT SOFT TISSUE NECK W CONTRAST  Order: [id number]  Status: Final result     Visible to patient: Yes (seen)     Next appt: None     Dx: Malignant neoplasm of anterior two-th. ..     0 Result Notes  Details    Reading Physician Reading Date Result Priority   Michael Enriquez MD  [phone] 8/1/2023      Narrative & Impression  EXAMINATION:  CT OF THE NECK SOFT TISSUE WITH CONTRAST     7/26/2023     TECHNIQUE:  CT of the neck was performed with the administration of intravenous contrast.  Multiplanar reformatted

## 2023-09-11 ENCOUNTER — OFFICE VISIT (OUTPATIENT)
Dept: ENT CLINIC | Age: 78
End: 2023-09-11
Payer: MEDICARE

## 2023-09-11 VITALS
OXYGEN SATURATION: 97 % | TEMPERATURE: 97.7 F | WEIGHT: 192 LBS | HEIGHT: 67 IN | SYSTOLIC BLOOD PRESSURE: 138 MMHG | HEART RATE: 73 BPM | DIASTOLIC BLOOD PRESSURE: 84 MMHG | BODY MASS INDEX: 30.13 KG/M2

## 2023-09-11 DIAGNOSIS — C02.3 MALIGNANT NEOPLASM OF ANTERIOR TWO-THIRDS OF TONGUE (HCC): Primary | Chronic | ICD-10-CM

## 2023-09-11 PROCEDURE — 3075F SYST BP GE 130 - 139MM HG: CPT | Performed by: OTOLARYNGOLOGY

## 2023-09-11 PROCEDURE — 1123F ACP DISCUSS/DSCN MKR DOCD: CPT | Performed by: OTOLARYNGOLOGY

## 2023-09-11 PROCEDURE — 99212 OFFICE O/P EST SF 10 MIN: CPT | Performed by: OTOLARYNGOLOGY

## 2023-09-11 PROCEDURE — 3079F DIAST BP 80-89 MM HG: CPT | Performed by: OTOLARYNGOLOGY

## 2023-09-11 NOTE — PROGRESS NOTES
Chief Complaint   Patient presents with    Cancer     Tongue, recheck/follow up. HISTORY OF PRESENT ILLNESS    Miguelito Breaux is a 76 y.o. male here for recheck and follow up of squamous cell carcinoma of the right lateral anterior tongue, excised on 11/4/2022, right partial glossectomy, excision of tongue cancer using CO2 laser, with closure. He has had no evidence of persistent disease since then. Patient stated that he is no longer biting his tongue. He has no other lesions or pain in the oral cavity or oropharynx or neck. REVIEW OF SYSTEMS  Constitutional:  Denied fever and chills. ENT/sinus:  Denied otalgia, otorrhea, nasal pain, rhinorrhea, sore throat, and sinus/facial pain. EXAMINATION    WDWN, NAD  Voice:  Normal.  Ears:  TMs, EACs, mastoids and pinnae were normal.    Nose:  Normal with no lesions. Sinuses:  NT x 4   Oral cavity:  The operated area of the tongue appeared to be well healed with no evidence of persistent disease. The protruding mass area of the right tip of the tongue appeared to be without increase in size and still soft and not indurated. The remainder of the tongue appeared to be normal.    Throat/OP:  Normal with no leukoplakia or other lesions. INDIRECT LARYNGOSCOPY:  Visualization was good. Vocal cords appeared to be normal with no leukoplakia, mass, polyp, nodule or ulceration and appeared to be normally mobile bilaterally with midline approximation on phonation. Otherwise, the epiglottis, supraglottis, false vocal cords, true vocal cords, base of tongue, subglottis, and piriform sinuses appeared to be normal.    Neck:  NT, No masses. Trachea midline. Nodes:  No lymphadenopathy. Juanita Ohara / Alayne Dakin / Seble Moulton:   Izabel Valverde was seen today for cancer. Diagnoses and all orders for this visit:    Malignant neoplasm of anterior two-thirds of tongue ([address])  Comments:  LB.          RECOMMENDATIONS / PLAN:   Return in about 1 month (around 10/11/2023)

## 2023-10-11 ENCOUNTER — OFFICE VISIT (OUTPATIENT)
Dept: ENT CLINIC | Age: 78
End: 2023-10-11

## 2023-10-11 VITALS
OXYGEN SATURATION: 98 % | HEART RATE: 83 BPM | HEIGHT: 67 IN | DIASTOLIC BLOOD PRESSURE: 86 MMHG | TEMPERATURE: 97.5 F | WEIGHT: 192 LBS | SYSTOLIC BLOOD PRESSURE: 142 MMHG | BODY MASS INDEX: 30.13 KG/M2

## 2023-10-11 DIAGNOSIS — Z85.810 HISTORY OF PRIMARY MALIGNANT NEOPLASM OF BASE OF TONGUE: Primary | Chronic | ICD-10-CM

## 2023-10-11 NOTE — PROGRESS NOTES
Chief Complaint   Patient presents with    Cancer     Tongue, recheck for surveillance. HISTORY OF PRESENT ILLNESS    John Liu is a 76 y.o. male here for recheck and follow up of tongue cancer. Patient stated that he is having no problems with his tongue. REVIEW OF SYSTEMS  Constitutional:  Denied fever and chills. ENT/sinus:  Denied otalgia, otorrhea, nasal pain, rhinorrhea, sore throat, and sinus/facial pain. EXAMINATION    WDWN, NAD  Voice:  Normal.  Ears:  TMs, EACs, mastoids and pinnae were normal.    Nose:  Normal with no lesions. Throat,  OC/ OP:  lump on right anterior tip of tongue with no change, soft, nontender. Operative area with no induration, mass, nodule or ulceration. Otherwise, normal with no lesions. Neck:  NT, No masses. Trachea midline. Nodes:  No lymphadenopathy. Juanjose Varghese / Tony Aaron / Sue Julian:   Vandana Britt was seen today for cancer. Diagnoses and all orders for this visit:    History of primary malignant neoplasm of base of tongue  Comments:  LB         RECOMMENDATIONS / PLAN:   Return in about 2 months (around 12/11/2023) for recheck/follow-up, and sooner if condition worsens.

## 2023-12-14 ENCOUNTER — OFFICE VISIT (OUTPATIENT)
Dept: ENT CLINIC | Age: 78
End: 2023-12-14

## 2023-12-14 VITALS
SYSTOLIC BLOOD PRESSURE: 134 MMHG | HEIGHT: 67 IN | WEIGHT: 192 LBS | TEMPERATURE: 97.5 F | OXYGEN SATURATION: 98 % | DIASTOLIC BLOOD PRESSURE: 72 MMHG | BODY MASS INDEX: 30.13 KG/M2 | HEART RATE: 68 BPM

## 2023-12-14 DIAGNOSIS — H61.22 IMPACTED CERUMEN OF LEFT EAR: ICD-10-CM

## 2023-12-14 DIAGNOSIS — L98.9 SKIN LESIONS: Chronic | ICD-10-CM

## 2023-12-14 DIAGNOSIS — H93.12 SUBJECTIVE TINNITUS OF LEFT EAR: Chronic | ICD-10-CM

## 2023-12-14 DIAGNOSIS — H90.12 CONDUCTIVE HEARING LOSS OF LEFT EAR WITH UNRESTRICTED HEARING OF RIGHT EAR: ICD-10-CM

## 2023-12-14 DIAGNOSIS — Z85.810 HISTORY OF PRIMARY MALIGNANT NEOPLASM OF BASE OF TONGUE: Primary | Chronic | ICD-10-CM

## 2023-12-14 RX ORDER — SIMVASTATIN 80 MG
1 TABLET ORAL NIGHTLY
COMMUNITY
Start: 2010-10-06

## 2023-12-14 RX ORDER — PREGABALIN 50 MG/1
50 CAPSULE ORAL NIGHTLY
COMMUNITY

## 2023-12-14 NOTE — PROGRESS NOTES
level, after cerumen removal.      HEARING ASSESSMENT (after ear cleaning)  Finger rub:  Able to hear finger rub bilaterally. Tuning fork tests, [address] tuning fork:  Zazueta test:  midline \"right in front of me. \"  Rinne test: right ear air greater than bone and left ear  air greater than bone        IMPRESSION / DIAGNOSES / ORDERS:   Yogi Sawant was seen today for cancer, tinnitus and skin lesion. Diagnoses and all orders for this visit:    History of primary malignant neoplasm of base of tongue  Comments:  LB. 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 Schedule biopsy of three skin lesions in office local anesthesia in 3 weeks, if persistent. Be off Plavix for one week and aspirin for 72 hours before biopsies of okay with PCP and prescribing physician.

## (undated) DEVICE — NEEDLE HYPO 27GA L1.25IN GRY POLYPR HUB S STL REG BVL STR

## (undated) DEVICE — SOLUTION IRRIG 500ML 0.9% SOD CHL USP POUR PLAS BTL

## (undated) DEVICE — SUTURE CHROMIC GUT SZ 3-0 L27IN ABSRB BRN L26MM SH 1/2 CIR G122H

## (undated) DEVICE — SUTURE CHROMIC GUT SZ 3-0 L27IN ABSRB BRN L19MM PS-2 3/8 1638H

## (undated) DEVICE — SUTURE VCRL + SZ 3-0 L27IN ABSRB UD L26MM SH 1/2 CIR VCP416H

## (undated) DEVICE — GLOVE ORANGE PI 8 1/2   MSG9085

## (undated) DEVICE — YANKAUER,BULB TIP,W/O VENT,RIGID,STERILE: Brand: MEDLINE

## (undated) DEVICE — HYPODERMIC SAFETY NEEDLE: Brand: MAGELLAN

## (undated) DEVICE — MASC TURNOVER KIT: Brand: MEDLINE INDUSTRIES, INC.

## (undated) DEVICE — CORD,CAUTERY,BIPOLAR,STERILE: Brand: MEDLINE

## (undated) DEVICE — SUTURE PERMAHAND SZ 4-0 L18IN NONABSORBABLE BLK L19MM PS-2 1677G

## (undated) DEVICE — PACK PROCEDURE SURG EXTREMITY MFFOP CUST

## (undated) DEVICE — SUTURE PERMA-HAND SZ 2-0 L30IN NONABSORBABLE BLK L26MM SH K833H

## (undated) DEVICE — SYRINGE MED 10ML TRNSLUC BRL PLUNG BLK MRK POLYPR CTRL

## (undated) DEVICE — PACKING WND THRT 18X2 IN STR LF DISP

## (undated) DEVICE — GOWN SIRUS NONREIN XL W/TWL: Brand: MEDLINE INDUSTRIES, INC.

## (undated) DEVICE — TUBING SMOKE EVAC 7/8INX10FT

## (undated) DEVICE — GOWN,REINFORCED,POLY,SIRUS,XLNG/XXLG: Brand: MEDLINE

## (undated) DEVICE — TOWEL,OR,DSP,ST,BLUE,STD,4/PK,20PK/CS: Brand: MEDLINE